# Patient Record
Sex: FEMALE | Race: WHITE | NOT HISPANIC OR LATINO | URBAN - METROPOLITAN AREA
[De-identification: names, ages, dates, MRNs, and addresses within clinical notes are randomized per-mention and may not be internally consistent; named-entity substitution may affect disease eponyms.]

---

## 2020-12-07 ENCOUNTER — OUTPATIENT (OUTPATIENT)
Dept: OUTPATIENT SERVICES | Facility: HOSPITAL | Age: 75
LOS: 1 days | End: 2020-12-07

## 2020-12-07 VITALS
TEMPERATURE: 98 F | DIASTOLIC BLOOD PRESSURE: 85 MMHG | HEART RATE: 67 BPM | WEIGHT: 197.98 LBS | OXYGEN SATURATION: 98 % | HEIGHT: 65.5 IN | SYSTOLIC BLOOD PRESSURE: 124 MMHG

## 2020-12-07 DIAGNOSIS — Z20.828 CONTACT WITH AND (SUSPECTED) EXPOSURE TO OTHER VIRAL COMMUNICABLE DISEASES: ICD-10-CM

## 2020-12-07 DIAGNOSIS — Z90.89 ACQUIRED ABSENCE OF OTHER ORGANS: Chronic | ICD-10-CM

## 2020-12-07 DIAGNOSIS — K57.20 DIVERTICULITIS OF LARGE INTESTINE WITH PERFORATION AND ABSCESS WITHOUT BLEEDING: ICD-10-CM

## 2020-12-07 DIAGNOSIS — Z98.51 TUBAL LIGATION STATUS: Chronic | ICD-10-CM

## 2020-12-07 DIAGNOSIS — I48.91 UNSPECIFIED ATRIAL FIBRILLATION: ICD-10-CM

## 2020-12-07 LAB
ANION GAP SERPL CALC-SCNC: 13 MMOL/L — SIGNIFICANT CHANGE UP (ref 7–14)
BLD GP AB SCN SERPL QL: NEGATIVE — SIGNIFICANT CHANGE UP
BUN SERPL-MCNC: 10 MG/DL — SIGNIFICANT CHANGE UP (ref 7–23)
CALCIUM SERPL-MCNC: 9.5 MG/DL — SIGNIFICANT CHANGE UP (ref 8.4–10.5)
CHLORIDE SERPL-SCNC: 104 MMOL/L — SIGNIFICANT CHANGE UP (ref 98–107)
CO2 SERPL-SCNC: 23 MMOL/L — SIGNIFICANT CHANGE UP (ref 22–31)
CREAT SERPL-MCNC: 0.81 MG/DL — SIGNIFICANT CHANGE UP (ref 0.5–1.3)
GLUCOSE SERPL-MCNC: 84 MG/DL — SIGNIFICANT CHANGE UP (ref 70–99)
HCT VFR BLD CALC: 44.2 % — SIGNIFICANT CHANGE UP (ref 34.5–45)
HGB BLD-MCNC: 13.6 G/DL — SIGNIFICANT CHANGE UP (ref 11.5–15.5)
MCHC RBC-ENTMCNC: 27.5 PG — SIGNIFICANT CHANGE UP (ref 27–34)
MCHC RBC-ENTMCNC: 30.8 GM/DL — LOW (ref 32–36)
MCV RBC AUTO: 89.5 FL — SIGNIFICANT CHANGE UP (ref 80–100)
NRBC # BLD: 0 /100 WBCS — SIGNIFICANT CHANGE UP
NRBC # FLD: 0 K/UL — SIGNIFICANT CHANGE UP
PLATELET # BLD AUTO: 266 K/UL — SIGNIFICANT CHANGE UP (ref 150–400)
POTASSIUM SERPL-MCNC: 3.8 MMOL/L — SIGNIFICANT CHANGE UP (ref 3.5–5.3)
POTASSIUM SERPL-SCNC: 3.8 MMOL/L — SIGNIFICANT CHANGE UP (ref 3.5–5.3)
RBC # BLD: 4.94 M/UL — SIGNIFICANT CHANGE UP (ref 3.8–5.2)
RBC # FLD: 13.9 % — SIGNIFICANT CHANGE UP (ref 10.3–14.5)
RH IG SCN BLD-IMP: POSITIVE — SIGNIFICANT CHANGE UP
SODIUM SERPL-SCNC: 140 MMOL/L — SIGNIFICANT CHANGE UP (ref 135–145)
WBC # BLD: 7.64 K/UL — SIGNIFICANT CHANGE UP (ref 3.8–10.5)
WBC # FLD AUTO: 7.64 K/UL — SIGNIFICANT CHANGE UP (ref 3.8–10.5)

## 2020-12-07 NOTE — H&P PST ADULT - NSANTHOSAYNRD_GEN_A_CORE
No. PERNELL screening performed.  STOP BANG Legend: 0-2 = LOW Risk; 3-4 = INTERMEDIATE Risk; 5-8 = HIGH Risk

## 2020-12-07 NOTE — H&P PST ADULT - NSICDXPASTMEDICALHX_GEN_ALL_CORE_FT
PAST MEDICAL HISTORY:  Abscess of bladder October 2020    Atrial fibrillation off Eliquis since October 2020    Hyperlipidemia     Obesity      PAST MEDICAL HISTORY:  Abscess of bladder October 2020    Atrial fibrillation off Eliquis since October 2020    Diverticulitis of large intestine with perforation and abscess without bleeding     Hyperlipidemia     Obesity

## 2020-12-07 NOTE — H&P PST ADULT - NEGATIVE ENMT SYMPTOMS
no tinnitus/no sinus symptoms/no hearing difficulty/no ear pain/no throat pain/no nasal congestion/no nose bleeds/no dysphagia/no gum bleeding/no vertigo

## 2020-12-07 NOTE — H&P PST ADULT - NSICDXPROBLEM_GEN_ALL_CORE_FT
PROBLEM DIAGNOSES  Problem: Diverticulitis of large intestine with perforation and abscess without bleeding  Assessment and Plan: Patient scheduled for laparoscopic low anterior resection possible stoma repair colvesical fistula on 12/14/2020  Written & verbal preop instructions, gi prophylaxis & surgical soap given  Pt verbalized good understanding.  Teach back done on surgical soap instructions.     Problem: Atrial fibrillation  Assessment and Plan: Patient currently not on anticoagulation, had preop Cardiology evaluation, pending copy of report, Echo and stress test    Problem: Encounter for screening laboratory testing for COVID-19 virus  Assessment and Plan: Patient aware of need for COVID testing prior to procedure and advised to co ordinate with surgeon.

## 2020-12-07 NOTE — H&P PST ADULT - NEGATIVE CARDIOVASCULAR SYMPTOMS
no palpitations/no claudication/no chest pain/no dyspnea on exertion/no peripheral edema/no orthopnea

## 2020-12-07 NOTE — H&P PST ADULT - GASTROINTESTINAL COMMENTS
refer to HPI preop diagnosis diverticulitis of large intestine with perforation and abscess without bleeding

## 2020-12-07 NOTE — H&P PST ADULT - NEGATIVE OPHTHALMOLOGIC SYMPTOMS
no blurred vision L/no pain L/no irritation L/no diplopia/no blurred vision R/no pain R/no irritation R

## 2020-12-07 NOTE — H&P PST ADULT - NSANTHSNORERD_ENT_A_CORE
Patient has switched from  Celexa 10 mg to 20 mg daily. Please send rx to pharmacy.
Pt called back on 8/14/19 and asked DANNY Brown if he could start taking two of the Celexa 10 mg and DANNY Brown approved it. However since he started taking two 10 mg tablets he ran out faster,and when he went to get it refilled they advised him it was to soon to get it refilled. The pharmacy would need to send a new prescription for the Celexa with the direction's to take two 10 mg pill's a day so that he don't run out again. Pt is scheduled to see DANNY Brown on 11/26/19.
No

## 2020-12-07 NOTE — H&P PST ADULT - MUSCULOSKELETAL
details… detailed exam no joint warmth/ROM intact/no joint swelling/no calf tenderness/no joint erythema

## 2020-12-07 NOTE — H&P PST ADULT - HISTORY OF PRESENT ILLNESS
74 yo female presents to [facility] for preop evaluation for laparoscopic low anterior resection possible stoma repair colovesical fistula.  Patient with complaints of difficulty with bowel movement, blood in stool and right lower back pack and was evaluated at [facility].  Patient had diagnostic imaging and diagnosed with diverticulitis of large intestine with perforation and abscess without bleeding.  Patient reports she continues to have difficulty with bowel movements but no longer has blood in stool.

## 2020-12-07 NOTE — H&P PST ADULT - NEGATIVE NEUROLOGICAL SYMPTOMS
no generalized seizures/no weakness/no loss of consciousness/no focal seizures/no vertigo/no paresthesias/no headache

## 2020-12-07 NOTE — H&P PST ADULT - ASSESSMENT
74 yo female with history of diverticulitis of large intestine with perforation and abscess without bleeding

## 2020-12-07 NOTE — H&P PST ADULT - RS GEN PE MLT RESP DETAILS PC
no rales/respirations non-labored/good air movement/airway patent/breath sounds equal/clear to auscultation bilaterally/no wheezes/no rhonchi

## 2020-12-15 PROBLEM — N30.80 OTHER CYSTITIS WITHOUT HEMATURIA: Chronic | Status: ACTIVE | Noted: 2020-12-07

## 2020-12-15 PROBLEM — K57.20 DIVERTICULITIS OF LARGE INTESTINE WITH PERFORATION AND ABSCESS WITHOUT BLEEDING: Chronic | Status: ACTIVE | Noted: 2020-12-07

## 2020-12-15 PROBLEM — E78.5 HYPERLIPIDEMIA, UNSPECIFIED: Chronic | Status: ACTIVE | Noted: 2020-12-07

## 2020-12-15 PROBLEM — E66.9 OBESITY, UNSPECIFIED: Chronic | Status: ACTIVE | Noted: 2020-12-07

## 2020-12-16 PROBLEM — Z00.00 ENCOUNTER FOR PREVENTIVE HEALTH EXAMINATION: Status: ACTIVE | Noted: 2020-12-16

## 2020-12-18 NOTE — ASU PATIENT PROFILE, ADULT - PMH
Abscess of bladder  October 2020  Atrial fibrillation  on no meds  Diverticulitis of large intestine with perforation and abscess without bleeding    Diverticulosis    Former smoker  quit 30 yrs ago  Hyperlipidemia    Obesity

## 2020-12-19 ENCOUNTER — APPOINTMENT (OUTPATIENT)
Dept: DISASTER EMERGENCY | Facility: CLINIC | Age: 75
End: 2020-12-19

## 2020-12-19 DIAGNOSIS — Z01.818 ENCOUNTER FOR OTHER PREPROCEDURAL EXAMINATION: ICD-10-CM

## 2020-12-21 ENCOUNTER — FORM ENCOUNTER (OUTPATIENT)
Age: 75
End: 2020-12-21

## 2020-12-21 LAB — SARS-COV-2 N GENE NPH QL NAA+PROBE: NOT DETECTED

## 2020-12-22 ENCOUNTER — OUTPATIENT (OUTPATIENT)
Dept: OUTPATIENT SERVICES | Facility: HOSPITAL | Age: 75
LOS: 1 days | End: 2020-12-22
Payer: MEDICARE

## 2020-12-22 VITALS — HEART RATE: 55 BPM | OXYGEN SATURATION: 99 % | SYSTOLIC BLOOD PRESSURE: 142 MMHG | DIASTOLIC BLOOD PRESSURE: 75 MMHG

## 2020-12-22 VITALS
DIASTOLIC BLOOD PRESSURE: 88 MMHG | HEART RATE: 61 BPM | WEIGHT: 199.96 LBS | OXYGEN SATURATION: 96 % | HEIGHT: 69 IN | TEMPERATURE: 99 F | RESPIRATION RATE: 18 BRPM | SYSTOLIC BLOOD PRESSURE: 131 MMHG

## 2020-12-22 DIAGNOSIS — R06.00 DYSPNEA, UNSPECIFIED: ICD-10-CM

## 2020-12-22 DIAGNOSIS — Z98.51 TUBAL LIGATION STATUS: Chronic | ICD-10-CM

## 2020-12-22 DIAGNOSIS — Z90.89 ACQUIRED ABSENCE OF OTHER ORGANS: Chronic | ICD-10-CM

## 2020-12-22 LAB
ALBUMIN SERPL ELPH-MCNC: 4.5 G/DL — SIGNIFICANT CHANGE UP (ref 3.3–5)
ALP SERPL-CCNC: 64 U/L — SIGNIFICANT CHANGE UP (ref 40–120)
ALT FLD-CCNC: 10 U/L — SIGNIFICANT CHANGE UP (ref 10–45)
ANION GAP SERPL CALC-SCNC: 10 MMOL/L — SIGNIFICANT CHANGE UP (ref 5–17)
APTT BLD: 31.1 SEC — SIGNIFICANT CHANGE UP (ref 27.5–35.5)
AST SERPL-CCNC: 26 U/L — SIGNIFICANT CHANGE UP (ref 10–40)
BILIRUB SERPL-MCNC: 0.4 MG/DL — SIGNIFICANT CHANGE UP (ref 0.2–1.2)
BUN SERPL-MCNC: 11 MG/DL — SIGNIFICANT CHANGE UP (ref 7–23)
CALCIUM SERPL-MCNC: 9.6 MG/DL — SIGNIFICANT CHANGE UP (ref 8.4–10.5)
CHLORIDE SERPL-SCNC: 106 MMOL/L — SIGNIFICANT CHANGE UP (ref 96–108)
CO2 SERPL-SCNC: 25 MMOL/L — SIGNIFICANT CHANGE UP (ref 22–31)
CREAT SERPL-MCNC: 0.75 MG/DL — SIGNIFICANT CHANGE UP (ref 0.5–1.3)
GLUCOSE SERPL-MCNC: 97 MG/DL — SIGNIFICANT CHANGE UP (ref 70–99)
HCT VFR BLD CALC: 44.5 % — SIGNIFICANT CHANGE UP (ref 34.5–45)
HGB BLD-MCNC: 14.5 G/DL — SIGNIFICANT CHANGE UP (ref 11.5–15.5)
INR BLD: 1 RATIO — SIGNIFICANT CHANGE UP (ref 0.88–1.16)
MCHC RBC-ENTMCNC: 28.5 PG — SIGNIFICANT CHANGE UP (ref 27–34)
MCHC RBC-ENTMCNC: 32.6 GM/DL — SIGNIFICANT CHANGE UP (ref 32–36)
MCV RBC AUTO: 87.4 FL — SIGNIFICANT CHANGE UP (ref 80–100)
NRBC # BLD: 0 /100 WBCS — SIGNIFICANT CHANGE UP (ref 0–0)
PLATELET # BLD AUTO: 252 K/UL — SIGNIFICANT CHANGE UP (ref 150–400)
POTASSIUM SERPL-MCNC: 4.4 MMOL/L — SIGNIFICANT CHANGE UP (ref 3.5–5.3)
POTASSIUM SERPL-SCNC: 4.4 MMOL/L — SIGNIFICANT CHANGE UP (ref 3.5–5.3)
PROT SERPL-MCNC: 7.2 G/DL — SIGNIFICANT CHANGE UP (ref 6–8.3)
PROTHROM AB SERPL-ACNC: 12 SEC — SIGNIFICANT CHANGE UP (ref 10.6–13.6)
RBC # BLD: 5.09 M/UL — SIGNIFICANT CHANGE UP (ref 3.8–5.2)
RBC # FLD: 13.6 % — SIGNIFICANT CHANGE UP (ref 10.3–14.5)
SODIUM SERPL-SCNC: 141 MMOL/L — SIGNIFICANT CHANGE UP (ref 135–145)
WBC # BLD: 8.31 K/UL — SIGNIFICANT CHANGE UP (ref 3.8–10.5)
WBC # FLD AUTO: 8.31 K/UL — SIGNIFICANT CHANGE UP (ref 3.8–10.5)

## 2020-12-22 PROCEDURE — 99152 MOD SED SAME PHYS/QHP 5/>YRS: CPT

## 2020-12-22 PROCEDURE — 93454 CORONARY ARTERY ANGIO S&I: CPT

## 2020-12-22 PROCEDURE — 85730 THROMBOPLASTIN TIME PARTIAL: CPT

## 2020-12-22 PROCEDURE — 93454 CORONARY ARTERY ANGIO S&I: CPT | Mod: 26

## 2020-12-22 PROCEDURE — 93010 ELECTROCARDIOGRAM REPORT: CPT

## 2020-12-22 PROCEDURE — 93005 ELECTROCARDIOGRAM TRACING: CPT

## 2020-12-22 PROCEDURE — C1894: CPT

## 2020-12-22 PROCEDURE — 80053 COMPREHEN METABOLIC PANEL: CPT

## 2020-12-22 PROCEDURE — 85610 PROTHROMBIN TIME: CPT

## 2020-12-22 PROCEDURE — 85027 COMPLETE CBC AUTOMATED: CPT

## 2020-12-22 PROCEDURE — C1769: CPT

## 2020-12-22 PROCEDURE — C1887: CPT

## 2020-12-22 RX ORDER — APIXABAN 2.5 MG/1
1 TABLET, FILM COATED ORAL
Qty: 0 | Refills: 0 | DISCHARGE

## 2020-12-22 RX ORDER — SODIUM CHLORIDE 9 MG/ML
3 INJECTION INTRAMUSCULAR; INTRAVENOUS; SUBCUTANEOUS EVERY 8 HOURS
Refills: 0 | Status: DISCONTINUED | OUTPATIENT
Start: 2020-12-22 | End: 2021-01-05

## 2020-12-22 NOTE — H&P CARDIOLOGY - HISTORY OF PRESENT ILLNESS
74 yo female PMH Afib, HLD and C. diff presents today for cardiac angiogram for cardiac clearance for laparoscopic low anterior resection possible stoma repair colovesical fistula.  Patient reports admission to [facility] where she was dx with afib and started on Eliquis and metoprolol. Evaluated by Dr Jiang after discharge, recommended to have a nuclear stress prior to Sx, which reveals inferior wall ischemia. Here today for [facility] for further ischemic evaluation

## 2020-12-22 NOTE — ASU DISCHARGE PLAN (ADULT/PEDIATRIC) - ASU DC SPECIAL INSTRUCTIONSFT
Wound Care:   the day AFTER your procedure remove bandage GENTLY, and clean using  mild soap and gentle warm, water stream, pat dry. leave OPEN to air. YOU MAY SHOWER   DO NOT apply lotions, creams, ointments, powder, perfumes to your incision site  DO NOT SOAK your site for 1 week ( no baths, no pools, no tubs, etc...)  Check  your groin and /or wrist daily. A small amount of bruising, and soreness are normal    ACTIVITY: for 24 hours   - DO NOT DRIVE  - DO NOT make any important decisions or sign legal documents   - DO NOT operate heavy machineries   - you may resume sexual activity in 48 hours, unless otherwise instructed by your cardiologist     If your procedure was done through the WRIST: for the NEXT 3DAYS:  - avoid pushing, pulling, with that affected wrist   - avoid repeated movement of that hand and wrist ( eg: typing, hammering)  - DO NOT LIFT anything more than 5 lbs     MEDICATION:   take your medications as explained ( see discharge paperwork)   If you received a STENT, you will be taking antiplatelet medications to KEEP YOUR STENT OPEN ( eg: Aspirin, Plavix, Brilinta, Effient, etc).  Take as prescribed DO NOT STOP taking them without consulting with your cardiologist first.     Follow heart healthy diet recommended by your doctor, , if you smoke STOP SMOKING ( may call [phone] for center of tobacco control if you need assistance)     CALL your doctor to make appointment in 2 WEEKS     ***CALL YOUR DOCTOR***  if you experience: fever, chills, body aches, or severe pain, swelling, redness, heat or yellow discharge at incision site  If you experience Bleeding or excruciating pain at the procedural site, swelling ( golf ball size) at your procedural site  If you experience CHEST PAIN  If you experience extremity numbness, tingling, temperature change ( of your procedural site)   If you are unable to reach your doctor, you may contact:   -Cardiology Office at [facility] at [phone] or   - [facility] [phone]  - [facility] [place] [phone]

## 2020-12-22 NOTE — ASU DISCHARGE PLAN (ADULT/PEDIATRIC) - CARE PROVIDER_API CALL
ALLAN NERI  Cardiovascular Diseases  [address] [place] [address] [address]  [place] [address]  Phone: [phone]  Fax: [phone]  Follow Up Time:

## 2020-12-22 NOTE — H&P CARDIOLOGY - PMH
Abscess of bladder  October 2020  Atrial fibrillation    Diverticulitis of large intestine with perforation and abscess without bleeding    Former smoker    Hyperlipidemia    Obesity

## 2020-12-30 ENCOUNTER — OUTPATIENT (OUTPATIENT)
Dept: OUTPATIENT SERVICES | Facility: HOSPITAL | Age: 75
LOS: 1 days | End: 2020-12-30

## 2020-12-30 VITALS
RESPIRATION RATE: 16 BRPM | DIASTOLIC BLOOD PRESSURE: 76 MMHG | SYSTOLIC BLOOD PRESSURE: 132 MMHG | HEIGHT: 67 IN | OXYGEN SATURATION: 98 % | WEIGHT: 195.99 LBS | TEMPERATURE: 98 F | HEART RATE: 98 BPM

## 2020-12-30 DIAGNOSIS — K57.20 DIVERTICULITIS OF LARGE INTESTINE WITH PERFORATION AND ABSCESS WITHOUT BLEEDING: ICD-10-CM

## 2020-12-30 DIAGNOSIS — Z98.51 TUBAL LIGATION STATUS: Chronic | ICD-10-CM

## 2020-12-30 DIAGNOSIS — Z87.19 PERSONAL HISTORY OF OTHER DISEASES OF THE DIGESTIVE SYSTEM: ICD-10-CM

## 2020-12-30 DIAGNOSIS — Z90.89 ACQUIRED ABSENCE OF OTHER ORGANS: Chronic | ICD-10-CM

## 2020-12-30 RX ORDER — METOPROLOL TARTRATE 50 MG
0.5 TABLET ORAL
Qty: 0 | Refills: 0 | DISCHARGE

## 2020-12-30 RX ORDER — APIXABAN 2.5 MG/1
1 TABLET, FILM COATED ORAL
Qty: 0 | Refills: 0 | DISCHARGE

## 2020-12-30 RX ORDER — ASPIRIN/CALCIUM CARB/MAGNESIUM 324 MG
1 TABLET ORAL
Qty: 0 | Refills: 0 | DISCHARGE

## 2020-12-30 NOTE — H&P PST ADULT - HISTORY OF PRESENT ILLNESS
74 yo female presents to [facility] for preop evaluation for laparoscopic low anterior resection possible stoma repair colovesical fistula.  Patient with complaints of difficulty with bowel movement, blood in stool when she wipes and right lower back pack and was evaluated at [facility].  Patient had diagnostic imaging and diagnosed with diverticulitis of large intestine with perforation and abscess without bleeding.  Patient reports she continues to have difficulty with bowel movements but no longer has blood in stool. 76 yo female presents to [facility] for preop evaluation for laparoscopic low anterior resection possible stoma repair colovesical fistula. Patient with complaints of difficulty with bowel movement, blood in stool when she wipes and right lower back pack and was evaluated at [facility].  Patient had diagnostic imaging and diagnosed with diverticulitis of large intestine with perforation and abscess without bleeding.  Patient reports she continues to have difficulty with bowel movements but no longer has blood in stool.

## 2020-12-30 NOTE — H&P PST ADULT - NSICDXPROBLEM_GEN_ALL_CORE_FT
PROBLEM DIAGNOSES  Problem: History of diverticulosis  Assessment and Plan:       R/O PROBLEM DIAGNOSES  Problem: Snoring  Assessment and Plan:     Problem: Atrial fibrillation  Assessment and Plan:      PROBLEM DIAGNOSES  Problem: History of diverticulosis  Assessment and Plan: Pt scheduled for laparoscopic lower anterior resection, possible stoma, possible repair , colovesical fistula.   labs pending, ekg in chart.  preop instructions provided to pt.   famotidine and chlorhexidine scrubs provided to pt.  pt going for COVID testing on 1/8/21        R/O PROBLEM DIAGNOSES  Problem: Snoring  Assessment and Plan: molina precautions recommended.  OR booking notified via fax.    Problem: Atrial fibrillation  Assessment and Plan: pt with h/o afib - on no anticoagulation.  pt had cardiac clearnce - will obtain copy.

## 2020-12-30 NOTE — H&P PST ADULT - NEUROLOGICAL DETAILS
alert and oriented x 3/normal strength alert and oriented x 3/responds to pain/responds to verbal commands/normal strength

## 2020-12-30 NOTE — H&P PST ADULT - NSICDXPASTMEDICALHX_GEN_ALL_CORE_FT
PAST MEDICAL HISTORY:  Abscess of bladder October 2020    Atrial fibrillation on no meds    Diverticulitis of large intestine with perforation and abscess without bleeding     Diverticulosis     Former smoker quit 30 yrs ago    Hyperlipidemia     Obesity

## 2020-12-30 NOTE — H&P PST ADULT - NEGATIVE NEUROLOGICAL SYMPTOMS
no weakness/no paresthesias/no generalized seizures/no focal seizures/no vertigo/no headache/no loss of consciousness

## 2020-12-30 NOTE — H&P PST ADULT - NSICDXFAMILYHX_GEN_ALL_CORE_FT
FAMILY HISTORY:  Mother  Still living? Unknown  FH: type 2 diabetes, Age at diagnosis: Age Unknown

## 2020-12-30 NOTE — H&P PST ADULT - ASSESSMENT
76 yo female with history of diverticulitis of large intestine with perforation and abscess without bleeding. presents to [facility] for preop evaluation for laparoscopic low anterior resection possible stoma repair colovesical fistula.

## 2020-12-30 NOTE — H&P PST ADULT - GASTROINTESTINAL COMMENTS
preop diagnosis diverticulitis of large intestine with perforation and abscess without bleeding blood in stool while wiping

## 2020-12-30 NOTE — H&P PST ADULT - MUSCULOSKELETAL
ROM intact/no joint swelling/no joint erythema/no joint warmth/no calf tenderness details… detailed exam

## 2020-12-30 NOTE — H&P PST ADULT - NEGATIVE MUSCULOSKELETAL SYMPTOMS
no arthralgia/no arthritis/no joint swelling/no muscle cramps/no muscle weakness/no stiffness/no back pain

## 2021-01-08 ENCOUNTER — APPOINTMENT (OUTPATIENT)
Dept: DISASTER EMERGENCY | Facility: CLINIC | Age: 76
End: 2021-01-08

## 2021-01-10 ENCOUNTER — TRANSCRIPTION ENCOUNTER (OUTPATIENT)
Age: 76
End: 2021-01-10

## 2021-01-10 LAB — SARS-COV-2 N GENE NPH QL NAA+PROBE: NOT DETECTED

## 2021-01-11 ENCOUNTER — RESULT REVIEW (OUTPATIENT)
Age: 76
End: 2021-01-11

## 2021-01-11 ENCOUNTER — INPATIENT (INPATIENT)
Facility: HOSPITAL | Age: 76
LOS: 7 days | Discharge: HOME | End: 2021-01-19
Payer: MEDICARE

## 2021-01-11 VITALS
HEIGHT: 67 IN | SYSTOLIC BLOOD PRESSURE: 162 MMHG | TEMPERATURE: 98 F | WEIGHT: 195.99 LBS | DIASTOLIC BLOOD PRESSURE: 76 MMHG | OXYGEN SATURATION: 98 % | RESPIRATION RATE: 16 BRPM | HEART RATE: 59 BPM

## 2021-01-11 DIAGNOSIS — Z90.89 ACQUIRED ABSENCE OF OTHER ORGANS: Chronic | ICD-10-CM

## 2021-01-11 DIAGNOSIS — Z98.51 TUBAL LIGATION STATUS: Chronic | ICD-10-CM

## 2021-01-11 DIAGNOSIS — K57.20 DIVERTICULITIS OF LARGE INTESTINE WITH PERFORATION AND ABSCESS WITHOUT BLEEDING: ICD-10-CM

## 2021-01-11 LAB
ANION GAP SERPL CALC-SCNC: 11 MMOL/L — SIGNIFICANT CHANGE UP (ref 7–14)
BUN SERPL-MCNC: 12 MG/DL — SIGNIFICANT CHANGE UP (ref 7–23)
CALCIUM SERPL-MCNC: 8.7 MG/DL — SIGNIFICANT CHANGE UP (ref 8.4–10.5)
CHLORIDE SERPL-SCNC: 102 MMOL/L — SIGNIFICANT CHANGE UP (ref 98–107)
CO2 SERPL-SCNC: 24 MMOL/L — SIGNIFICANT CHANGE UP (ref 22–31)
CREAT SERPL-MCNC: 0.9 MG/DL — SIGNIFICANT CHANGE UP (ref 0.5–1.3)
GLUCOSE SERPL-MCNC: 191 MG/DL — HIGH (ref 70–99)
HCT VFR BLD CALC: 44.4 % — SIGNIFICANT CHANGE UP (ref 34.5–45)
HGB BLD-MCNC: 14 G/DL — SIGNIFICANT CHANGE UP (ref 11.5–15.5)
MAGNESIUM SERPL-MCNC: 1.9 MG/DL — SIGNIFICANT CHANGE UP (ref 1.6–2.6)
MCHC RBC-ENTMCNC: 27.7 PG — SIGNIFICANT CHANGE UP (ref 27–34)
MCHC RBC-ENTMCNC: 31.5 GM/DL — LOW (ref 32–36)
MCV RBC AUTO: 87.9 FL — SIGNIFICANT CHANGE UP (ref 80–100)
NRBC # BLD: 0 /100 WBCS — SIGNIFICANT CHANGE UP
NRBC # FLD: 0 K/UL — SIGNIFICANT CHANGE UP
PHOSPHATE SERPL-MCNC: 3.5 MG/DL — SIGNIFICANT CHANGE UP (ref 2.5–4.5)
PLATELET # BLD AUTO: 217 K/UL — SIGNIFICANT CHANGE UP (ref 150–400)
POTASSIUM SERPL-MCNC: 3.6 MMOL/L — SIGNIFICANT CHANGE UP (ref 3.5–5.3)
POTASSIUM SERPL-SCNC: 3.6 MMOL/L — SIGNIFICANT CHANGE UP (ref 3.5–5.3)
RBC # BLD: 5.05 M/UL — SIGNIFICANT CHANGE UP (ref 3.8–5.2)
RBC # FLD: 13.7 % — SIGNIFICANT CHANGE UP (ref 10.3–14.5)
SODIUM SERPL-SCNC: 137 MMOL/L — SIGNIFICANT CHANGE UP (ref 135–145)
WBC # BLD: 14.53 K/UL — HIGH (ref 3.8–10.5)
WBC # FLD AUTO: 14.53 K/UL — HIGH (ref 3.8–10.5)

## 2021-01-11 PROCEDURE — 88304 TISSUE EXAM BY PATHOLOGIST: CPT | Mod: 26

## 2021-01-11 PROCEDURE — 88307 TISSUE EXAM BY PATHOLOGIST: CPT | Mod: 26

## 2021-01-11 RX ORDER — SIMVASTATIN 20 MG/1
1 TABLET, FILM COATED ORAL
Qty: 0 | Refills: 0 | DISCHARGE

## 2021-01-11 RX ORDER — PIPERACILLIN AND TAZOBACTAM 4; .5 G/20ML; G/20ML
3.38 INJECTION, POWDER, LYOPHILIZED, FOR SOLUTION INTRAVENOUS EVERY 8 HOURS
Refills: 0 | Status: DISCONTINUED | OUTPATIENT
Start: 2021-01-11 | End: 2021-01-14

## 2021-01-11 RX ORDER — POTASSIUM CHLORIDE 20 MEQ
10 PACKET (EA) ORAL
Refills: 0 | Status: COMPLETED | OUTPATIENT
Start: 2021-01-11 | End: 2021-01-11

## 2021-01-11 RX ORDER — HYDROMORPHONE HYDROCHLORIDE 2 MG/ML
0.5 INJECTION INTRAMUSCULAR; INTRAVENOUS; SUBCUTANEOUS
Refills: 0 | Status: DISCONTINUED | OUTPATIENT
Start: 2021-01-11 | End: 2021-01-11

## 2021-01-11 RX ORDER — ENOXAPARIN SODIUM 100 MG/ML
40 INJECTION SUBCUTANEOUS DAILY
Refills: 0 | Status: DISCONTINUED | OUTPATIENT
Start: 2021-01-11 | End: 2021-01-19

## 2021-01-11 RX ORDER — HYDROMORPHONE HYDROCHLORIDE 2 MG/ML
1 INJECTION INTRAMUSCULAR; INTRAVENOUS; SUBCUTANEOUS
Refills: 0 | Status: DISCONTINUED | OUTPATIENT
Start: 2021-01-11 | End: 2021-01-11

## 2021-01-11 RX ORDER — ACETAMINOPHEN 500 MG
1000 TABLET ORAL EVERY 6 HOURS
Refills: 0 | Status: COMPLETED | OUTPATIENT
Start: 2021-01-11 | End: 2021-01-12

## 2021-01-11 RX ORDER — SODIUM CHLORIDE 9 MG/ML
1000 INJECTION, SOLUTION INTRAVENOUS
Refills: 0 | Status: DISCONTINUED | OUTPATIENT
Start: 2021-01-11 | End: 2021-01-11

## 2021-01-11 RX ORDER — HYDROMORPHONE HYDROCHLORIDE 2 MG/ML
0.5 INJECTION INTRAMUSCULAR; INTRAVENOUS; SUBCUTANEOUS EVERY 4 HOURS
Refills: 0 | Status: DISCONTINUED | OUTPATIENT
Start: 2021-01-11 | End: 2021-01-13

## 2021-01-11 RX ORDER — MAGNESIUM SULFATE 500 MG/ML
1 VIAL (ML) INJECTION ONCE
Refills: 0 | Status: COMPLETED | OUTPATIENT
Start: 2021-01-11 | End: 2021-01-11

## 2021-01-11 RX ADMIN — Medication 400 MILLIGRAM(S): at 22:25

## 2021-01-11 RX ADMIN — SODIUM CHLORIDE 100 MILLILITER(S): 9 INJECTION, SOLUTION INTRAVENOUS at 12:14

## 2021-01-11 RX ADMIN — Medication 100 MILLIEQUIVALENT(S): at 17:44

## 2021-01-11 RX ADMIN — Medication 100 MILLIEQUIVALENT(S): at 16:41

## 2021-01-11 RX ADMIN — Medication 100 GRAM(S): at 15:53

## 2021-01-11 RX ADMIN — PIPERACILLIN AND TAZOBACTAM 25 GRAM(S): 4; .5 INJECTION, POWDER, LYOPHILIZED, FOR SOLUTION INTRAVENOUS at 23:11

## 2021-01-11 RX ADMIN — Medication 400 MILLIGRAM(S): at 16:37

## 2021-01-11 RX ADMIN — Medication 100 MILLIEQUIVALENT(S): at 18:47

## 2021-01-11 RX ADMIN — PIPERACILLIN AND TAZOBACTAM 25 GRAM(S): 4; .5 INJECTION, POWDER, LYOPHILIZED, FOR SOLUTION INTRAVENOUS at 13:38

## 2021-01-11 RX ADMIN — ENOXAPARIN SODIUM 40 MILLIGRAM(S): 100 INJECTION SUBCUTANEOUS at 12:33

## 2021-01-11 NOTE — CHART NOTE - NSCHARTNOTEFT_GEN_A_CORE
POST-OP NOTE    JULES BUSTILLOS | [id number] | [facility] 05    Procedure: s/p laparoscopic assisted low anterior resection with bladder fistula     Subjective: Patient is recovering well in PACU. She is awake and alert, has no complaints. Pain is well controlled.     Vital Signs Last 24 Hrs  T(C): 37 (11 Jan 2021 11:45), Max: 37 (11 Jan 2021 11:45)  T(F): 98.6 (11 Jan 2021 11:45), Max: 98.6 (11 Jan 2021 11:45)  HR: 47 (11 Jan 2021 16:30) (46 - 83)  BP: 116/38 (11 Jan 2021 16:00) (106/44 - 162/76)  BP(mean): 59 (11 Jan 2021 16:00) (57 - 79)  RR: 11 (11 Jan 2021 16:30) (10 - 19)  SpO2: 100% (11 Jan 2021 16:30) (93% - 100%)  I&O's Summary    11 Jan 2021 07:01  -  11 Jan 2021 16:52  --------------------------------------------------------  IN: 200 mL / OUT: 420 mL / NET: -220 mL                            14.0   14.53 )-----------( 217      ( 11 Jan 2021 12:47 )             44.4     01-11    137  |  102  |  12  ----------------------------<  191<H>  3.6   |  24  |  0.90    Ca    8.7      11 Jan 2021 12:47  Phos  3.5     01-11  Mg     1.9     01-11      PHYSICAL EXAM:  Gen: NAD  Resp: breathing easily, no stridor  CV: RRR  Abdomen: soft, nontender, nondistended, midline incision covered with Aquacell dressing, two left-sided lap ports dressings c/d/i, RLQ MARLON draining SS fluid.   : ramirez in place draining clear yellow urine   Skin: Normal color, no rashes or lesions      Gurt Rosalva PGY1  [facility] #[id number]

## 2021-01-11 NOTE — BRIEF OPERATIVE NOTE - OPERATION/FINDINGS
laparoscopic assisted low anterior resection, three ports place, colon mobilized along white line of Toldt up to splenic flexure, midline incision extending from umbilicus to pubis proximal and distal colon transected with TA stapler, fistula noted to bladder with pus draining (cultures sent), Bakers anastomosis completed using EEA stapler, negative leak test, JPx1 in pelvis

## 2021-01-12 DIAGNOSIS — E78.5 HYPERLIPIDEMIA, UNSPECIFIED: ICD-10-CM

## 2021-01-12 DIAGNOSIS — N30.80 OTHER CYSTITIS WITHOUT HEMATURIA: ICD-10-CM

## 2021-01-12 DIAGNOSIS — I48.91 UNSPECIFIED ATRIAL FIBRILLATION: ICD-10-CM

## 2021-01-12 LAB
ANION GAP SERPL CALC-SCNC: 12 MMOL/L — SIGNIFICANT CHANGE UP (ref 7–14)
BUN SERPL-MCNC: 7 MG/DL — SIGNIFICANT CHANGE UP (ref 7–23)
CALCIUM SERPL-MCNC: 8.8 MG/DL — SIGNIFICANT CHANGE UP (ref 8.4–10.5)
CHLORIDE SERPL-SCNC: 103 MMOL/L — SIGNIFICANT CHANGE UP (ref 98–107)
CO2 SERPL-SCNC: 22 MMOL/L — SIGNIFICANT CHANGE UP (ref 22–31)
CREAT SERPL-MCNC: 0.92 MG/DL — SIGNIFICANT CHANGE UP (ref 0.5–1.3)
GLUCOSE SERPL-MCNC: 128 MG/DL — HIGH (ref 70–99)
GRAM STN FLD: SIGNIFICANT CHANGE UP
HCT VFR BLD CALC: 41.9 % — SIGNIFICANT CHANGE UP (ref 34.5–45)
HGB BLD-MCNC: 13.3 G/DL — SIGNIFICANT CHANGE UP (ref 11.5–15.5)
MAGNESIUM SERPL-MCNC: 2 MG/DL — SIGNIFICANT CHANGE UP (ref 1.6–2.6)
MCHC RBC-ENTMCNC: 27.6 PG — SIGNIFICANT CHANGE UP (ref 27–34)
MCHC RBC-ENTMCNC: 31.7 GM/DL — LOW (ref 32–36)
MCV RBC AUTO: 86.9 FL — SIGNIFICANT CHANGE UP (ref 80–100)
NRBC # BLD: 0 /100 WBCS — SIGNIFICANT CHANGE UP
NRBC # FLD: 0 K/UL — SIGNIFICANT CHANGE UP
PHOSPHATE SERPL-MCNC: 2.8 MG/DL — SIGNIFICANT CHANGE UP (ref 2.5–4.5)
PLATELET # BLD AUTO: 240 K/UL — SIGNIFICANT CHANGE UP (ref 150–400)
POTASSIUM SERPL-MCNC: 4 MMOL/L — SIGNIFICANT CHANGE UP (ref 3.5–5.3)
POTASSIUM SERPL-SCNC: 4 MMOL/L — SIGNIFICANT CHANGE UP (ref 3.5–5.3)
RBC # BLD: 4.82 M/UL — SIGNIFICANT CHANGE UP (ref 3.8–5.2)
RBC # FLD: 13.8 % — SIGNIFICANT CHANGE UP (ref 10.3–14.5)
SODIUM SERPL-SCNC: 137 MMOL/L — SIGNIFICANT CHANGE UP (ref 135–145)
SPECIMEN SOURCE: SIGNIFICANT CHANGE UP
WBC # BLD: 15.32 K/UL — HIGH (ref 3.8–10.5)
WBC # FLD AUTO: 15.32 K/UL — HIGH (ref 3.8–10.5)

## 2021-01-12 RX ORDER — SODIUM CHLORIDE 9 MG/ML
1000 INJECTION, SOLUTION INTRAVENOUS
Refills: 0 | Status: DISCONTINUED | OUTPATIENT
Start: 2021-01-12 | End: 2021-01-14

## 2021-01-12 RX ORDER — SODIUM CHLORIDE 9 MG/ML
1000 INJECTION INTRAMUSCULAR; INTRAVENOUS; SUBCUTANEOUS
Refills: 0 | Status: DISCONTINUED | OUTPATIENT
Start: 2021-01-12 | End: 2021-01-12

## 2021-01-12 RX ORDER — ACETAMINOPHEN 500 MG
1000 TABLET ORAL EVERY 6 HOURS
Refills: 0 | Status: COMPLETED | OUTPATIENT
Start: 2021-01-12 | End: 2021-01-13

## 2021-01-12 RX ADMIN — Medication 400 MILLIGRAM(S): at 23:38

## 2021-01-12 RX ADMIN — PIPERACILLIN AND TAZOBACTAM 25 GRAM(S): 4; .5 INJECTION, POWDER, LYOPHILIZED, FOR SOLUTION INTRAVENOUS at 22:04

## 2021-01-12 RX ADMIN — Medication 400 MILLIGRAM(S): at 17:46

## 2021-01-12 RX ADMIN — PIPERACILLIN AND TAZOBACTAM 25 GRAM(S): 4; .5 INJECTION, POWDER, LYOPHILIZED, FOR SOLUTION INTRAVENOUS at 13:10

## 2021-01-12 RX ADMIN — SODIUM CHLORIDE 100 MILLILITER(S): 9 INJECTION, SOLUTION INTRAVENOUS at 09:29

## 2021-01-12 RX ADMIN — PIPERACILLIN AND TAZOBACTAM 25 GRAM(S): 4; .5 INJECTION, POWDER, LYOPHILIZED, FOR SOLUTION INTRAVENOUS at 06:29

## 2021-01-12 RX ADMIN — SODIUM CHLORIDE 100 MILLILITER(S): 9 INJECTION INTRAMUSCULAR; INTRAVENOUS; SUBCUTANEOUS at 00:12

## 2021-01-12 RX ADMIN — Medication 400 MILLIGRAM(S): at 10:45

## 2021-01-12 RX ADMIN — Medication 400 MILLIGRAM(S): at 04:16

## 2021-01-12 RX ADMIN — SODIUM CHLORIDE 100 MILLILITER(S): 9 INJECTION INTRAMUSCULAR; INTRAVENOUS; SUBCUTANEOUS at 06:29

## 2021-01-12 RX ADMIN — ENOXAPARIN SODIUM 40 MILLIGRAM(S): 100 INJECTION SUBCUTANEOUS at 11:42

## 2021-01-12 NOTE — CONSULT NOTE ADULT - ASSESSMENT
74 yo female presents to [facility] for preop evaluation for laparoscopic low anterior resection possible stoma repair colovesical fistula now s/p uneventful surgery

## 2021-01-12 NOTE — PROGRESS NOTE ADULT - SUBJECTIVE AND OBJECTIVE BOX
STATUS POST:  Laparoscopic low anterior resection with colovesicular fistula     POST OPERATIVE DAY #: 1    Vital Signs Last 24 Hrs  T(C): 36.7 (12 Jan 2021 05:45), Max: 37 (11 Jan 2021 11:45)  T(F): 98 (12 Jan 2021 05:45), Max: 98.6 (11 Jan 2021 11:45)  HR: 61 (12 Jan 2021 05:45) (46 - 83)  BP: 140/65 (12 Jan 2021 05:45) (106/44 - 146/61)  BP(mean): 78 (11 Jan 2021 20:00) (57 - 118)  RR: 19 (12 Jan 2021 05:45) (10 - 25)  SpO2: 98% (12 Jan 2021 05:45) (91% - 100%)      SUBJECTIVE: Pt seen and examined. No acute events overnight. Pain tolerated. NPO with sips; -BM/-flatus.     Ramirez: 1290  MARLON: 90    General Appearance: Appears well, NAD  Neck: Supple  Chest: Equal expansion bilaterally, equal breath sounds  CV: Pulse regular presently  Abdomen: Soft, appropriate incisional tenderness, dressings clean and dry and intact  : ramirez in place draining clear yellow urine  Extremities: Grossly symmetric, SCD's in place     I&O's Summary    11 Jan 2021 07:01  -  12 Jan 2021 07:00  --------------------------------------------------------  IN: 700 mL / OUT: 1380 mL / NET: -680 mL      I&O's Detail    11 Jan 2021 07:01  -  12 Jan 2021 07:00  --------------------------------------------------------  IN:    Lactated Ringers: 700 mL  Total IN: 700 mL    OUT:    Bulb (mL): 90 mL    Indwelling Catheter - Urethral (mL): 1290 mL  Total OUT: 1380 mL    Total NET: -680 mL          MEDICATIONS  (STANDING):  acetaminophen  IVPB .. 1000 milliGRAM(s) IV Intermittent every 6 hours  enoxaparin Injectable 40 milliGRAM(s) SubCutaneous daily  lactated ringers. 1000 milliLiter(s) (100 mL/Hr) IV Continuous <Continuous>  piperacillin/tazobactam IVPB.. 3.375 Gram(s) IV Intermittent every 8 hours    MEDICATIONS  (PRN):  HYDROmorphone  Injectable 0.5 milliGRAM(s) IV Push every 4 hours PRN Moderate Pain (4 - 6)      LABS:                        14.0   14.53 )-----------( 217      ( 11 Jan 2021 12:47 )             44.4     01-11    137  |  102  |  12  ----------------------------<  191<H>  3.6   |  24  |  0.90    Ca    8.7      11 Jan 2021 12:47  Phos  3.5     01-11  Mg     1.9     01-11            RADIOLOGY & ADDITIONAL STUDIES:

## 2021-01-12 NOTE — CONSULT NOTE ADULT - PROBLEM SELECTOR RECOMMENDATION 2
s/p surgery  continue piperacillin/tazobactam  clinically improving   leukocytosis should improve in a day or two  pain management with Tylenol

## 2021-01-12 NOTE — PROGRESS NOTE ADULT - ASSESSMENT
74 yo female with HLD, diverticulitis, colovesicular fistula s/p laparoscopic LAR 1/11, recovering well on floors.     Plan:    - Pain control  - Diet: NPO with sips  - IVF  - C/w Timmons (will need CT cystogram)  - Monitor I&Os   - PT eval  - Zosyn  - DVT ppx: Lovenox    [facility]   [id number]

## 2021-01-12 NOTE — CONSULT NOTE ADULT - SUBJECTIVE AND OBJECTIVE BOX
Patient is a 75y old  Female who presents with a chief complaint of "I am having colon surgery" (30 Dec 2020 12:10)      HPI:    74 yo female presents to [facility] for preop evaluation for laparoscopic low anterior resection possible stoma repair colovesical fistula. Patient with complaints of difficulty with bowel movement, blood in stool when she wipes and right lower back pack and was evaluated at [facility].  Patient had diagnostic imaging and diagnosed with diverticulitis of large intestine with perforation and abscess without bleeding.  Patient reports she continues to have difficulty with bowel movements but no longer has blood in stool. She is POD # 1 today, c/o lower abdominal pain 6/10      PAST MEDICAL & SURGICAL HISTORY:  Diverticulosis    Former smoker  quit 30 yrs ago    Diverticulitis of large intestine with perforation and abscess without bleeding    Obesity    Atrial fibrillation  on no meds    Abscess of bladder  October 2020    Hyperlipidemia    History of tonsillectomy    History of tubal ligation        Review of Systems:   CONSTITUTIONAL: No fever, weight loss, or fatigue  EYES: No eye pain, visual disturbances, or discharge  ENMT:  No difficulty hearing, tinnitus, vertigo; No sinus or throat pain  NECK: No pain or stiffness  RESPIRATORY: No cough, wheezing, chills or hemoptysis; No shortness of breath  CARDIOVASCULAR: No chest pain, palpitations, dizziness, leg swelling or sob  GASTROINTESTINAL: No abdominal pain. No nausea, vomiting, diarrhea or constipation  GENITOURINARY: No dysuria, frequency, hematuria, or incontinence  NEUROLOGICAL: No headaches, memory loss, loss of strength, numbness, or tremors  SKIN: No itching, burning, rashes, or lesions   LYMPH NODES: No enlarged glands  ENDOCRINE: No heat or cold intolerance; No hair loss  MUSCULOSKELETAL: No joint pain or swelling; No muscle, back, or extremity pain  PSYCHIATRIC: No depression, anxiety, mood swings, or difficulty sleeping  HEME/LYMPH: No easy bruising, or bleeding gums  ALLERGY AND IMMUNOLOGIC: No hives or eczema    Allergies    No Known Allergies      Social History: non smoker quit 30 years prior  no IVDA  no ETOH abuse   lives with     FAMILY HISTORY:  FH: type 2 diabetes (Mother)  mother        MEDICATIONS  (STANDING):  enoxaparin Injectable 40 milliGRAM(s) SubCutaneous daily  lactated ringers. 1000 milliLiter(s) (100 mL/Hr) IV Continuous <Continuous>  piperacillin/tazobactam IVPB.. 3.375 Gram(s) IV Intermittent every 8 hours    MEDICATIONS  (PRN):  HYDROmorphone  Injectable 0.5 milliGRAM(s) IV Push every 4 hours PRN Moderate Pain (4 - 6)      CAPILLARY BLOOD GLUCOSE        I&O's Summary    11 Jan 2021 07:01  -  12 Jan 2021 07:00  --------------------------------------------------------  IN: 700 mL / OUT: 1380 mL / NET: -680 mL    12 Jan 2021 07:01  -  12 Jan 2021 10:52  --------------------------------------------------------  IN: 0 mL / OUT: 90 mL / NET: -90 mL        24hrs Vital:  T(C): 36.7 (01-12-21 @ 05:45), Max: 37 (01-11-21 @ 11:45)  HR: 61 (01-12-21 @ 05:45) (46 - 83)  BP: 140/65 (01-12-21 @ 05:45) (106/44 - 146/61)  RR: 19 (01-12-21 @ 05:45) (10 - 25)  SpO2: 98% (01-12-21 @ 05:45) (91% - 100%)    PHYSICAL EXAM:  GENERAL: NAD, well-developed  HEAD:  Atraumatic, Normocephalic  EYES: EOMI, PERRLA, conjunctiva and sclera clear  NECK: Supple, No JVD  CHEST/LUNG: Clear to auscultation bilaterally; No wheeze  HEART: S1S2; No rubs, or gallops, no murmurs  ABDOMEN: Soft, appropriately tender incisions  drains in place  EXTREMITIES:  + Peripheral Pulses, No clubbing or cyanosis, no edema  PSYCH: AO x 3,   NEUROLOGY: Alert, no focal motor or sensory deficits  SKIN: No rashes or lesions    LABS:                        14.0   14.53 )-----------( 217      ( 11 Jan 2021 12:47 )             44.4     01-11    137  |  102  |  12  ----------------------------<  191<H>  3.6   |  24  |  0.90    Ca    8.7      11 Jan 2021 12:47  Phos  3.5     01-11  Mg     1.9     01-11                RADIOLOGY & ADDITIONAL TESTS:    Consultant(s) Notes Reviewed:      Care Discussed with Consultants/Other Providers:

## 2021-01-12 NOTE — PROGRESS NOTE ADULT - SUBJECTIVE AND OBJECTIVE BOX
ANESTHESIA POSTOP CHECK    75y Female POSTOP DAY 1    Vital Signs Last 24 Hrs  T(C): 36.7 (12 Jan 2021 05:45), Max: 37 (11 Jan 2021 11:45)  T(F): 98 (12 Jan 2021 05:45), Max: 98.6 (11 Jan 2021 11:45)  HR: 61 (12 Jan 2021 05:45) (46 - 83)  BP: 140/65 (12 Jan 2021 05:45) (106/44 - 146/61)  BP(mean): 78 (11 Jan 2021 20:00) (57 - 118)  RR: 19 (12 Jan 2021 05:45) (10 - 25)  SpO2: 98% (12 Jan 2021 05:45) (91% - 100%)  I&O's Summary    11 Jan 2021 07:01  -  12 Jan 2021 07:00  --------------------------------------------------------  IN: 700 mL / OUT: 1380 mL / NET: -680 mL    12 Jan 2021 07:01  -  12 Jan 2021 11:03  --------------------------------------------------------  IN: 0 mL / OUT: 90 mL / NET: -90 mL        [X ] NO APPARENT ANESTHESIA COMPLICATIONS      Patient sitting up in bed, c/o pain with movement, awaiting pain medication.

## 2021-01-13 LAB
-  AMIKACIN: SIGNIFICANT CHANGE UP
-  AMIKACIN: SIGNIFICANT CHANGE UP
-  AMOXICILLIN/CLAVULANIC ACID: SIGNIFICANT CHANGE UP
-  AMOXICILLIN/CLAVULANIC ACID: SIGNIFICANT CHANGE UP
-  AMPICILLIN/SULBACTAM: SIGNIFICANT CHANGE UP
-  AMPICILLIN/SULBACTAM: SIGNIFICANT CHANGE UP
-  AMPICILLIN: SIGNIFICANT CHANGE UP
-  AMPICILLIN: SIGNIFICANT CHANGE UP
-  AZTREONAM: SIGNIFICANT CHANGE UP
-  AZTREONAM: SIGNIFICANT CHANGE UP
-  CEFAZOLIN: SIGNIFICANT CHANGE UP
-  CEFAZOLIN: SIGNIFICANT CHANGE UP
-  CEFEPIME: SIGNIFICANT CHANGE UP
-  CEFEPIME: SIGNIFICANT CHANGE UP
-  CEFOXITIN: SIGNIFICANT CHANGE UP
-  CEFOXITIN: SIGNIFICANT CHANGE UP
-  CEFTRIAXONE: SIGNIFICANT CHANGE UP
-  CEFTRIAXONE: SIGNIFICANT CHANGE UP
-  CIPROFLOXACIN: SIGNIFICANT CHANGE UP
-  CIPROFLOXACIN: SIGNIFICANT CHANGE UP
-  ERTAPENEM: SIGNIFICANT CHANGE UP
-  ERTAPENEM: SIGNIFICANT CHANGE UP
-  GENTAMICIN: SIGNIFICANT CHANGE UP
-  GENTAMICIN: SIGNIFICANT CHANGE UP
-  IMIPENEM: SIGNIFICANT CHANGE UP
-  IMIPENEM: SIGNIFICANT CHANGE UP
-  LEVOFLOXACIN: SIGNIFICANT CHANGE UP
-  LEVOFLOXACIN: SIGNIFICANT CHANGE UP
-  MEROPENEM: SIGNIFICANT CHANGE UP
-  MEROPENEM: SIGNIFICANT CHANGE UP
-  PIPERACILLIN/TAZOBACTAM: SIGNIFICANT CHANGE UP
-  PIPERACILLIN/TAZOBACTAM: SIGNIFICANT CHANGE UP
-  TOBRAMYCIN: SIGNIFICANT CHANGE UP
-  TOBRAMYCIN: SIGNIFICANT CHANGE UP
-  TRIMETHOPRIM/SULFAMETHOXAZOLE: SIGNIFICANT CHANGE UP
-  TRIMETHOPRIM/SULFAMETHOXAZOLE: SIGNIFICANT CHANGE UP
ANION GAP SERPL CALC-SCNC: 12 MMOL/L — SIGNIFICANT CHANGE UP (ref 7–14)
BUN SERPL-MCNC: 6 MG/DL — LOW (ref 7–23)
CALCIUM SERPL-MCNC: 9.1 MG/DL — SIGNIFICANT CHANGE UP (ref 8.4–10.5)
CHLORIDE SERPL-SCNC: 101 MMOL/L — SIGNIFICANT CHANGE UP (ref 98–107)
CO2 SERPL-SCNC: 24 MMOL/L — SIGNIFICANT CHANGE UP (ref 22–31)
CREAT SERPL-MCNC: 0.71 MG/DL — SIGNIFICANT CHANGE UP (ref 0.5–1.3)
GLUCOSE SERPL-MCNC: 91 MG/DL — SIGNIFICANT CHANGE UP (ref 70–99)
HCT VFR BLD CALC: 40.8 % — SIGNIFICANT CHANGE UP (ref 34.5–45)
HGB BLD-MCNC: 12.8 G/DL — SIGNIFICANT CHANGE UP (ref 11.5–15.5)
MAGNESIUM SERPL-MCNC: 2.1 MG/DL — SIGNIFICANT CHANGE UP (ref 1.6–2.6)
MCHC RBC-ENTMCNC: 27.6 PG — SIGNIFICANT CHANGE UP (ref 27–34)
MCHC RBC-ENTMCNC: 31.4 GM/DL — LOW (ref 32–36)
MCV RBC AUTO: 87.9 FL — SIGNIFICANT CHANGE UP (ref 80–100)
METHOD TYPE: SIGNIFICANT CHANGE UP
METHOD TYPE: SIGNIFICANT CHANGE UP
NRBC # BLD: 0 /100 WBCS — SIGNIFICANT CHANGE UP
NRBC # FLD: 0 K/UL — SIGNIFICANT CHANGE UP
PHOSPHATE SERPL-MCNC: 2.5 MG/DL — SIGNIFICANT CHANGE UP (ref 2.5–4.5)
PLATELET # BLD AUTO: 244 K/UL — SIGNIFICANT CHANGE UP (ref 150–400)
POTASSIUM SERPL-MCNC: 3.8 MMOL/L — SIGNIFICANT CHANGE UP (ref 3.5–5.3)
POTASSIUM SERPL-SCNC: 3.8 MMOL/L — SIGNIFICANT CHANGE UP (ref 3.5–5.3)
RBC # BLD: 4.64 M/UL — SIGNIFICANT CHANGE UP (ref 3.8–5.2)
RBC # FLD: 14 % — SIGNIFICANT CHANGE UP (ref 10.3–14.5)
SODIUM SERPL-SCNC: 137 MMOL/L — SIGNIFICANT CHANGE UP (ref 135–145)
WBC # BLD: 12.35 K/UL — HIGH (ref 3.8–10.5)
WBC # FLD AUTO: 12.35 K/UL — HIGH (ref 3.8–10.5)

## 2021-01-13 RX ORDER — PANTOPRAZOLE SODIUM 20 MG/1
40 TABLET, DELAYED RELEASE ORAL ONCE
Refills: 0 | Status: COMPLETED | OUTPATIENT
Start: 2021-01-13 | End: 2021-01-13

## 2021-01-13 RX ORDER — OXYCODONE HYDROCHLORIDE 5 MG/1
5 TABLET ORAL EVERY 4 HOURS
Refills: 0 | Status: DISCONTINUED | OUTPATIENT
Start: 2021-01-13 | End: 2021-01-19

## 2021-01-13 RX ORDER — ACETAMINOPHEN 500 MG
650 TABLET ORAL EVERY 6 HOURS
Refills: 0 | Status: DISCONTINUED | OUTPATIENT
Start: 2021-01-13 | End: 2021-01-19

## 2021-01-13 RX ORDER — CALCIUM CARBONATE 500(1250)
1 TABLET ORAL ONCE
Refills: 0 | Status: DISCONTINUED | OUTPATIENT
Start: 2021-01-13 | End: 2021-01-14

## 2021-01-13 RX ADMIN — OXYCODONE HYDROCHLORIDE 5 MILLIGRAM(S): 5 TABLET ORAL at 12:16

## 2021-01-13 RX ADMIN — PIPERACILLIN AND TAZOBACTAM 25 GRAM(S): 4; .5 INJECTION, POWDER, LYOPHILIZED, FOR SOLUTION INTRAVENOUS at 14:28

## 2021-01-13 RX ADMIN — ENOXAPARIN SODIUM 40 MILLIGRAM(S): 100 INJECTION SUBCUTANEOUS at 12:17

## 2021-01-13 RX ADMIN — PANTOPRAZOLE SODIUM 40 MILLIGRAM(S): 20 TABLET, DELAYED RELEASE ORAL at 18:58

## 2021-01-13 RX ADMIN — PIPERACILLIN AND TAZOBACTAM 25 GRAM(S): 4; .5 INJECTION, POWDER, LYOPHILIZED, FOR SOLUTION INTRAVENOUS at 21:12

## 2021-01-13 RX ADMIN — Medication 400 MILLIGRAM(S): at 05:46

## 2021-01-13 RX ADMIN — SODIUM CHLORIDE 100 MILLILITER(S): 9 INJECTION, SOLUTION INTRAVENOUS at 00:00

## 2021-01-13 RX ADMIN — PIPERACILLIN AND TAZOBACTAM 25 GRAM(S): 4; .5 INJECTION, POWDER, LYOPHILIZED, FOR SOLUTION INTRAVENOUS at 06:08

## 2021-01-13 RX ADMIN — Medication 650 MILLIGRAM(S): at 18:32

## 2021-01-13 RX ADMIN — Medication 650 MILLIGRAM(S): at 12:17

## 2021-01-13 NOTE — PROGRESS NOTE ADULT - SUBJECTIVE AND OBJECTIVE BOX
A TEAM Surgery Progress Note  Patient is a 75y old  Female who presents with a chief complaint of abdominal surgery (12 Jan 2021 10:51)      INTERVAL EVENTS: Patient is POD#2 s/p LAR and takedown No acute events overnight.  SUBJECTIVE: Patient seen and examined at bedside with surgical team, patient without complaints. Denies fever, chills, CP, SOB nausea, vomiting, abdominal pain.    REVIEW OF SYSTEMS:  Constitutional: No fevers or chills. No malaise or weakness.  EENT: No vision changes. No ear pain. No nasal congestion or rhinitis. No throat pain or dysphagia.  Respiratory: No cough, wheezing, or SOB. No hemoptysis.  Cardiovascular: No chest pain or palpitations.  Gastrointestinal: No abdominal pain. No nausea, vomiting, diarrhea or constipation. No hematochezia. No melena.  Genitourinary: No dysuria, hematuria, or frequency.  Neurologic: No numbness or tingling. No weakness.  Skin: No rashes or pruritus.     OBJECTIVE:    Vital Signs Last 24 Hrs  T(C): 36.8 (13 Jan 2021 05:40), Max: 36.9 (12 Jan 2021 21:44)  T(F): 98.2 (13 Jan 2021 05:40), Max: 98.5 (12 Jan 2021 21:44)  HR: 68 (13 Jan 2021 05:40) (65 - 78)  BP: 130/75 (13 Jan 2021 05:40) (125/74 - 142/78)  BP(mean): --  RR: 17 (13 Jan 2021 05:40) (16 - 18)  SpO2: 100% (13 Jan 2021 05:40) (100% - 100%)I&O's Detail    12 Jan 2021 07:01  -  13 Jan 2021 07:00  --------------------------------------------------------  IN:  Total IN: 0 mL    OUT:    Bulb (mL): 150 mL    Indwelling Catheter - Urethral (mL): 3050 mL  Total OUT: 3200 mL    Total NET: -3200 mL      MEDICATIONS  (STANDING):  acetaminophen   Tablet .. 650 milliGRAM(s) Oral every 6 hours  enoxaparin Injectable 40 milliGRAM(s) SubCutaneous daily  lactated ringers. 1000 milliLiter(s) (100 mL/Hr) IV Continuous <Continuous>  piperacillin/tazobactam IVPB.. 3.375 Gram(s) IV Intermittent every 8 hours    MEDICATIONS  (PRN):  oxyCODONE    IR 5 milliGRAM(s) Oral every 4 hours PRN Moderate Pain (4 - 6)      PHYSICAL EXAM:  Constitutional: A&Ox3, NAD  Respiratory: Unlabored breathing  Abdomen: Soft, nondistended, NTTP. No rebound or guarding.  Extremities: WWP, MOYA spontaneously    LABS:                        12.8   12.35 )-----------( 244      ( 13 Jan 2021 08:03 )             40.8     01-13    137  |  101  |  6<L>  ----------------------------<  91  3.8   |  24  |  0.71    Ca    9.1      13 Jan 2021 08:03  Phos  2.5     01-13  Mg     2.1     01-13                IMAGING:     A TEAM Surgery Progress Note  Patient is a 75y old  Female who presents with a chief complaint of abdominal surgery (12 Jan 2021 10:51)      INTERVAL EVENTS: Patient is POD#2 s/p LAR and takedown colovesical fistula. No acute events overnight.    SUBJECTIVE: Patient seen and examined at bedside with surgical team, patient without complaints. Abdominal pain is tolerable. Denies nausea nor vomiting, tolerating sips of liquids. Has not yet been OOB. Denies fever, chills, CP, SOB.     REVIEW OF SYSTEMS:  Constitutional: No fevers or chills. No malaise or weakness.  EENT: No vision changes. No ear pain. No nasal congestion or rhinitis. No throat pain or dysphagia.  Respiratory: No cough, wheezing, or SOB. No hemoptysis.  Cardiovascular: No chest pain or palpitations.  Gastrointestinal: No abdominal pain. No nausea, vomiting, diarrhea or constipation. No hematochezia. No melena.  Genitourinary: No dysuria, hematuria, or frequency.  Neurologic: No numbness or tingling. No weakness.  Skin: No rashes or pruritus.     OBJECTIVE:    Vital Signs Last 24 Hrs  T(C): 36.8 (13 Jan 2021 05:40), Max: 36.9 (12 Jan 2021 21:44)  T(F): 98.2 (13 Jan 2021 05:40), Max: 98.5 (12 Jan 2021 21:44)  HR: 68 (13 Jan 2021 05:40) (65 - 78)  BP: 130/75 (13 Jan 2021 05:40) (125/74 - 142/78)  BP(mean): --  RR: 17 (13 Jan 2021 05:40) (16 - 18)  SpO2: 100% (13 Jan 2021 05:40) (100% - 100%)I&O's Detail    12 Jan 2021 07:01  -  13 Jan 2021 07:00  --------------------------------------------------------  IN:  Total IN: 0 mL    OUT:    Bulb (mL): 150 mL    Indwelling Catheter - Urethral (mL): 3050 mL  Total OUT: 3200 mL    Total NET: -3200 mL      MEDICATIONS  (STANDING):  acetaminophen   Tablet .. 650 milliGRAM(s) Oral every 6 hours  enoxaparin Injectable 40 milliGRAM(s) SubCutaneous daily  lactated ringers. 1000 milliLiter(s) (100 mL/Hr) IV Continuous <Continuous>  piperacillin/tazobactam IVPB.. 3.375 Gram(s) IV Intermittent every 8 hours    MEDICATIONS  (PRN):  oxyCODONE    IR 5 milliGRAM(s) Oral every 4 hours PRN Moderate Pain (4 - 6)      PHYSICAL EXAM:  Constitutional: A&Ox3, NAD  Respiratory: Unlabored breathing  Abdomen: Soft, nondistended, NTTP. No rebound or guarding. Incisions C/D/I. MARLON with serosanguinous drainage.   Extremities: WWP, MOYA spontaneously    LABS:                        12.8   12.35 )-----------( 244      ( 13 Jan 2021 08:03 )             40.8     01-13    137  |  101  |  6<L>  ----------------------------<  91  3.8   |  24  |  0.71    Ca    9.1      13 Jan 2021 08:03  Phos  2.5     01-13  Mg     2.1     01-13                IMAGING:

## 2021-01-13 NOTE — PROGRESS NOTE ADULT - ASSESSMENT
76 yo female with HLD, diverticulitis, colovesicular fistula s/p laparoscopic LAR 1/11, recovering well on floors.     PLAN:   - Pain control PRN  - Diet: NPO with sips  - continue IVF  - C/w Timmons (will need CT cystogram prior to d/c)  - Monitor I&Os   - c/w Zosyn, await OR cx sensitivities  - DVT ppx: Lovenox  - Apppreciate PT evaluation: Dispo home without PT needs    A team surgery   [id number]

## 2021-01-13 NOTE — PROGRESS NOTE ADULT - SUBJECTIVE AND OBJECTIVE BOX
Patient is a 75y old  Female who presents with a chief complaint of abdominal surgery (12 Jan 2021 10:51)      DATE OF SERVICE: 01-13-21 @ 11:21    SUBJECTIVE / OVERNIGHT EVENTS: overnight events noted    ROS:  Resp: No cough no sputum production  CVS: No chest pain no palpitations no orthopnea  GI: no N/V/D  : no dysuria, no hematuria  Neuro: no weakness no paresthesias  Heme: No petechiae no easy bruising  Msk: No joint pain no swelling  Skin: No rash no itching        MEDICATIONS  (STANDING):  acetaminophen   Tablet .. 650 milliGRAM(s) Oral every 6 hours  enoxaparin Injectable 40 milliGRAM(s) SubCutaneous daily  lactated ringers. 1000 milliLiter(s) (100 mL/Hr) IV Continuous <Continuous>  piperacillin/tazobactam IVPB.. 3.375 Gram(s) IV Intermittent every 8 hours    MEDICATIONS  (PRN):  oxyCODONE    IR 5 milliGRAM(s) Oral every 4 hours PRN Moderate Pain (4 - 6)        CAPILLARY BLOOD GLUCOSE        I&O's Summary    12 Jan 2021 07:01  -  13 Jan 2021 07:00  --------------------------------------------------------  IN: 0 mL / OUT: 3200 mL / NET: -3200 mL    13 Jan 2021 07:01  -  13 Jan 2021 11:21  --------------------------------------------------------  IN: 0 mL / OUT: 25 mL / NET: -25 mL        Vital Signs Last 24 Hrs  T(C): 36.8 (13 Jan 2021 05:40), Max: 36.9 (12 Jan 2021 21:44)  T(F): 98.2 (13 Jan 2021 05:40), Max: 98.5 (12 Jan 2021 21:44)  HR: 68 (13 Jan 2021 05:40) (65 - 78)  BP: 130/75 (13 Jan 2021 05:40) (125/74 - 142/78)  BP(mean): --  RR: 17 (13 Jan 2021 05:40) (16 - 18)  SpO2: 100% (13 Jan 2021 05:40) (100% - 100%)    PHYSICAL EXAM:   HEENT: KYLE EOMI  Neck: Supple, no JVD  Lungs: no wheeze, no crackles  CVS: S1 S2 no M/R/G  Abdomen: minimal appropriate tenderness, BS present  + MARLON drain  Neuro: AO x 3 non focal  Skin: warm, dry  Ext: no edema  Msk: no joint swelling or deformities      LABS:                        12.8   12.35 )-----------( 244      ( 13 Jan 2021 08:03 )             40.8     01-13    137  |  101  |  6<L>  ----------------------------<  91  3.8   |  24  |  0.71    Ca    9.1      13 Jan 2021 08:03  Phos  2.5     01-13  Mg     2.1     01-13                  All consultant(s) notes reviewed and care discussed with other providers        Contact Number, Dr Davis [phone]

## 2021-01-13 NOTE — PHYSICAL THERAPY INITIAL EVALUATION ADULT - SIT-TO-STAND BALANCE
Orders entered for 2 units PRBCs with referral to [facility] for transfusion per communication from ELIER Fan RN, MADISON Hayes  
fair plus

## 2021-01-13 NOTE — PROGRESS NOTE ADULT - ASSESSMENT
76 yo female presents to [facility] for preop evaluation for laparoscopic low anterior resection possible stoma repair colovesical fistula now s/p uneventful surgery

## 2021-01-13 NOTE — PHYSICAL THERAPY INITIAL EVALUATION ADULT - PERTINENT HX OF CURRENT PROBLEM, REHAB EVAL
74 yo female presents to [facility] for preop evaluation for laparoscopic low anterior resection possible stoma repair colovesical fistula. Patient with complaints of difficulty with bowel movement, blood in stool when she wipes and right lower back pack and was evaluated at [facility].  Patient had diagnostic imaging and diagnosed with diverticulitis of large intestine with perforation and abscess without bleeding.

## 2021-01-13 NOTE — PHYSICAL THERAPY INITIAL EVALUATION ADULT - ADDITIONAL COMMENTS
Pt lives with her  in apartment, there is flight to enter but pt can use elevator if wanted. Pt was independent in all ADL prior to admission. Pt denies using assistive device prior to admission.

## 2021-01-13 NOTE — PROVIDER CONTACT NOTE (CRITICAL VALUE NOTIFICATION) - SITUATION
Gram Stain Numerous polymorphonuclear Leukocytes  Few gram variable rods  few gram (+) cocci in pairs  all seen by oil power field

## 2021-01-14 ENCOUNTER — TRANSCRIPTION ENCOUNTER (OUTPATIENT)
Age: 76
End: 2021-01-14

## 2021-01-14 LAB
ANION GAP SERPL CALC-SCNC: 14 MMOL/L — SIGNIFICANT CHANGE UP (ref 7–14)
BUN SERPL-MCNC: 9 MG/DL — SIGNIFICANT CHANGE UP (ref 7–23)
CALCIUM SERPL-MCNC: 8.8 MG/DL — SIGNIFICANT CHANGE UP (ref 8.4–10.5)
CHLORIDE SERPL-SCNC: 103 MMOL/L — SIGNIFICANT CHANGE UP (ref 98–107)
CHOLEST SERPL-MCNC: 153 MG/DL — SIGNIFICANT CHANGE UP
CO2 SERPL-SCNC: 21 MMOL/L — LOW (ref 22–31)
CREAT SERPL-MCNC: 0.68 MG/DL — SIGNIFICANT CHANGE UP (ref 0.5–1.3)
GLUCOSE SERPL-MCNC: 71 MG/DL — SIGNIFICANT CHANGE UP (ref 70–99)
HCT VFR BLD CALC: 38.6 % — SIGNIFICANT CHANGE UP (ref 34.5–45)
HDLC SERPL-MCNC: 52 MG/DL — SIGNIFICANT CHANGE UP
HGB BLD-MCNC: 12 G/DL — SIGNIFICANT CHANGE UP (ref 11.5–15.5)
LIPID PNL WITH DIRECT LDL SERPL: 75 MG/DL — SIGNIFICANT CHANGE UP
MAGNESIUM SERPL-MCNC: 1.8 MG/DL — SIGNIFICANT CHANGE UP (ref 1.6–2.6)
MCHC RBC-ENTMCNC: 27.8 PG — SIGNIFICANT CHANGE UP (ref 27–34)
MCHC RBC-ENTMCNC: 31.1 GM/DL — LOW (ref 32–36)
MCV RBC AUTO: 89.6 FL — SIGNIFICANT CHANGE UP (ref 80–100)
NON HDL CHOLESTEROL: 101 MG/DL — SIGNIFICANT CHANGE UP
NRBC # BLD: 0 /100 WBCS — SIGNIFICANT CHANGE UP
NRBC # FLD: 0 K/UL — SIGNIFICANT CHANGE UP
PHOSPHATE SERPL-MCNC: 2.4 MG/DL — LOW (ref 2.5–4.5)
PLATELET # BLD AUTO: 232 K/UL — SIGNIFICANT CHANGE UP (ref 150–400)
POTASSIUM SERPL-MCNC: 3.8 MMOL/L — SIGNIFICANT CHANGE UP (ref 3.5–5.3)
POTASSIUM SERPL-SCNC: 3.8 MMOL/L — SIGNIFICANT CHANGE UP (ref 3.5–5.3)
RBC # BLD: 4.31 M/UL — SIGNIFICANT CHANGE UP (ref 3.8–5.2)
RBC # FLD: 13.9 % — SIGNIFICANT CHANGE UP (ref 10.3–14.5)
SODIUM SERPL-SCNC: 138 MMOL/L — SIGNIFICANT CHANGE UP (ref 135–145)
TRIGL SERPL-MCNC: 132 MG/DL — SIGNIFICANT CHANGE UP
WBC # BLD: 11.39 K/UL — HIGH (ref 3.8–10.5)
WBC # FLD AUTO: 11.39 K/UL — HIGH (ref 3.8–10.5)

## 2021-01-14 RX ORDER — FLUCONAZOLE 150 MG/1
200 TABLET ORAL EVERY 24 HOURS
Refills: 0 | Status: DISCONTINUED | OUTPATIENT
Start: 2021-01-15 | End: 2021-01-19

## 2021-01-14 RX ORDER — ERTAPENEM SODIUM 1 G/1
1000 INJECTION, POWDER, LYOPHILIZED, FOR SOLUTION INTRAMUSCULAR; INTRAVENOUS ONCE
Refills: 0 | Status: COMPLETED | OUTPATIENT
Start: 2021-01-14 | End: 2021-01-14

## 2021-01-14 RX ORDER — MAGNESIUM SULFATE 500 MG/ML
2 VIAL (ML) INJECTION ONCE
Refills: 0 | Status: COMPLETED | OUTPATIENT
Start: 2021-01-14 | End: 2021-01-14

## 2021-01-14 RX ORDER — FLUCONAZOLE 150 MG/1
TABLET ORAL
Refills: 0 | Status: DISCONTINUED | OUTPATIENT
Start: 2021-01-14 | End: 2021-01-19

## 2021-01-14 RX ORDER — SODIUM CHLORIDE 9 MG/ML
1000 INJECTION, SOLUTION INTRAVENOUS
Refills: 0 | Status: DISCONTINUED | OUTPATIENT
Start: 2021-01-14 | End: 2021-01-16

## 2021-01-14 RX ORDER — POTASSIUM PHOSPHATE, MONOBASIC POTASSIUM PHOSPHATE, DIBASIC 236; 224 MG/ML; MG/ML
15 INJECTION, SOLUTION INTRAVENOUS ONCE
Refills: 0 | Status: COMPLETED | OUTPATIENT
Start: 2021-01-14 | End: 2021-01-14

## 2021-01-14 RX ORDER — FLUCONAZOLE 150 MG/1
200 TABLET ORAL ONCE
Refills: 0 | Status: COMPLETED | OUTPATIENT
Start: 2021-01-14 | End: 2021-01-14

## 2021-01-14 RX ORDER — ERTAPENEM SODIUM 1 G/1
INJECTION, POWDER, LYOPHILIZED, FOR SOLUTION INTRAMUSCULAR; INTRAVENOUS
Refills: 0 | Status: DISCONTINUED | OUTPATIENT
Start: 2021-01-14 | End: 2021-01-19

## 2021-01-14 RX ORDER — ERTAPENEM SODIUM 1 G/1
1000 INJECTION, POWDER, LYOPHILIZED, FOR SOLUTION INTRAMUSCULAR; INTRAVENOUS EVERY 24 HOURS
Refills: 0 | Status: DISCONTINUED | OUTPATIENT
Start: 2021-01-15 | End: 2021-01-19

## 2021-01-14 RX ADMIN — Medication 650 MILLIGRAM(S): at 18:23

## 2021-01-14 RX ADMIN — POTASSIUM PHOSPHATE, MONOBASIC POTASSIUM PHOSPHATE, DIBASIC 62.5 MILLIMOLE(S): 236; 224 INJECTION, SOLUTION INTRAVENOUS at 11:52

## 2021-01-14 RX ADMIN — FLUCONAZOLE 100 MILLIGRAM(S): 150 TABLET ORAL at 11:41

## 2021-01-14 RX ADMIN — OXYCODONE HYDROCHLORIDE 5 MILLIGRAM(S): 5 TABLET ORAL at 11:52

## 2021-01-14 RX ADMIN — PIPERACILLIN AND TAZOBACTAM 25 GRAM(S): 4; .5 INJECTION, POWDER, LYOPHILIZED, FOR SOLUTION INTRAVENOUS at 05:24

## 2021-01-14 RX ADMIN — Medication 50 GRAM(S): at 11:51

## 2021-01-14 RX ADMIN — SODIUM CHLORIDE 100 MILLILITER(S): 9 INJECTION, SOLUTION INTRAVENOUS at 11:52

## 2021-01-14 RX ADMIN — Medication 650 MILLIGRAM(S): at 11:57

## 2021-01-14 RX ADMIN — SODIUM CHLORIDE 50 MILLILITER(S): 9 INJECTION, SOLUTION INTRAVENOUS at 21:05

## 2021-01-14 RX ADMIN — ERTAPENEM SODIUM 120 MILLIGRAM(S): 1 INJECTION, POWDER, LYOPHILIZED, FOR SOLUTION INTRAMUSCULAR; INTRAVENOUS at 11:57

## 2021-01-14 RX ADMIN — ENOXAPARIN SODIUM 40 MILLIGRAM(S): 100 INJECTION SUBCUTANEOUS at 11:58

## 2021-01-14 RX ADMIN — Medication 650 MILLIGRAM(S): at 02:39

## 2021-01-14 NOTE — DISCHARGE NOTE PROVIDER - NSDCMRMEDTOKEN_GEN_ALL_CORE_FT
simvastatin 20 mg oral tablet: 1 tab(s) orally once a day (at bedtime)   ciprofloxacin 500 mg oral tablet: 1 tab(s) orally 2 times a day MDD:2  Diflucan 200 mg oral tablet: 1 tab(s) orally once a day MDD:1  simvastatin 20 mg oral tablet: 1 tab(s) orally once a day (at bedtime)   Bactrim  mg-160 mg oral tablet: 1 milligram(s) orally 2 times a day   Diflucan 200 mg oral tablet: 1 tab(s) orally once a day MDD:1  simvastatin 20 mg oral tablet: 1 tab(s) orally once a day (at bedtime)

## 2021-01-14 NOTE — DISCHARGE NOTE PROVIDER - HOSPITAL COURSE
This patient is a 74 yo female who presented to [facility] on 1/11/21 for laparoscopic low anterior resection possible stoma repair colovesical fistula with Dr. Renner. Patient with complaints of difficulty with bowel movement, blood in stool when she wipes and right lower back pack and was evaluated at [facility].  Patient had diagnostic imaging and diagnosed with diverticulitis of large intestine with perforation and abscess without bleeding.  Patient reports she continues to have difficulty with bowel movements but no longer has blood in stool. Patient underwent laparoscopic LAR as scheduled. Fistula noted draining to bladder. Timmons catheter left in place at the end ofthe case. Patient tolerated the procedure well, without complication. Patient remained hemodynamically stable in the PACU and transferred to the surgical floor. Diet was restarted and advanced as tolerated. Pain control was transitioned from IV to PO pain meds.   On POD#... patient had CT pelvis cystogram which revealed:     At this time, patient is currently ambulating, voiding, tolerating a regular diet. Pain well controlled on PO pain meds. Patient has been deemed stable for discharge home with follow up as an outpatient.    This patient is a 74 yo female who presented to [facility] on 1/11/21 for laparoscopic low anterior resection possible stoma repair colovesical fistula with Dr. Renner. Patient with complaints of difficulty with bowel movement, blood in stool when she wipes and right lower back pack and was evaluated at [facility].  Patient had diagnostic imaging and diagnosed with diverticulitis of large intestine with perforation and abscess without bleeding.  Patient reports she continues to have difficulty with bowel movements but no longer has blood in stool. Patient underwent laparoscopic LAR as scheduled. Fistula noted draining to bladder. Timmons catheter left in place at the end of the case. Patient tolerated the procedure well, without complication. Patient remained hemodynamically stable in the PACU and transferred to the surgical floor. Diet was restarted and advanced as tolerated. Pain control was transitioned from IV to PO pain meds.   On POD#7 patient had CT pelvis cystogram which revealed: no extravasation of contrast in the bladder and no fistula was appreciated. Timmons was DC'd and patient passed her TOV thereafter. Her MARLON drain was removed prior to DC as well.    At this time, patient is currently ambulating, voiding, tolerating a regular diet. Pain well controlled on PO pain meds. Patient has been deemed stable for discharge home with follow up as an outpatient.

## 2021-01-14 NOTE — PROGRESS NOTE ADULT - ASSESSMENT
76 yo female with HLD, diverticulitis, colovesicular fistula s/p laparoscopic LAR 1/11, recovering well on floors.     PLAN:   - Pain control PRN  - Diet: NPO with sips  - Monitor for GI function  - continue IVF  - C/w Timmons (will need CT cystogram prior to d/c)  - Monitor I&Os   - c/w Zosyn, OR cx showed sensitivity  - DVT ppx: Lovenox  - Appreciate PT evaluation: Dispo home without PT needs    A team surgery   [id number]

## 2021-01-14 NOTE — DISCHARGE NOTE PROVIDER - CARE PROVIDER_API CALL
Court Renner  COLON/RECTAL SURGERY  [address], [address]  [place] [address]  Phone: [phone]  Fax: [phone]  Follow Up Time: 2 weeks

## 2021-01-14 NOTE — DISCHARGE NOTE PROVIDER - NSDCCPCAREPLAN_GEN_ALL_CORE_FT
PRINCIPAL DISCHARGE DIAGNOSIS  Diagnosis: Diverticulitis  Assessment and Plan of Treatment: DRAIN CARE: Cuong Stern drain (MARLON Drain) It is important that you measure and record the fluid that is in it (output) on an output record sheet.  Please bring the output record sheet to your follow-up appointment. - Keep the drain secured to your clothing with a safety pin at all times to avoid accidental displacement. - Monitor the insertion site for redness, pain, swelling, or purulent drainage. - You may shower with the drain. Wash around the drain with soap and water, pat dry, and reapply dressing. - If you noticed a sudden change in the color or amount of fluid in the drain, please contact your surgeon’s office. - Your drain will be removed at an outpatient follow up appointment.   WOUND CARE:  Please keep incisions clean and dry. Please do not Scrub or rub incisions. Do not use lotion or powder on incisions.   BATHING: You may shower and/or sponge bathe. You may use warm soapy water in the shower and rinse, pat dry.  ACTIVITY: No heavy lifting or straining. Otherwise, you may return to your usual level of physical activity. If you are taking narcotic pain medication DO NOT drive a car, operate machinery or make important decisions.  DIET: Continue a low residue/low fiber diet.  NOTIFY YOUR SURGEON IF YOU HAVE: any bleeding that does not stop, any pus draining from your wound(s), any fever (over 100.4 F) persistent nausea/vomiting, or if your pain is not controlled on your discharge pain medications, unable to urinate.  Please follow up with your primary care physician in one week regarding your hospitalization, bring copies of your discharge paperwork.  Please follow up with your surgeon, Dr. Renner. Call to make an appointment [phone].      SECONDARY DISCHARGE DIAGNOSES  Diagnosis: History of diverticulosis  Assessment and Plan of Treatment:      PRINCIPAL DISCHARGE DIAGNOSIS  Diagnosis: Diverticulitis  Assessment and Plan of Treatment: WOUND CARE:  Please keep incisions clean and dry. Please do not Scrub or rub incisions. Do not use lotion or powder on incisions. Staples will be removed at your follow up visit as an outpatient.  BATHING: You may shower and/or sponge bathe. You may use warm soapy water in the shower and rinse, pat dry.  ACTIVITY: No heavy lifting or straining. Otherwise, you may return to your usual level of physical activity. If you are taking narcotic pain medication DO NOT drive a car, operate machinery or make important decisions.  DIET: Continue a low residue/low fiber diet.  PAIN: You may take 650mg of Tylenol every 6 hours as needed for pain relief.  NOTIFY YOUR SURGEON IF YOU HAVE: any bleeding that does not stop, any pus draining from your wound(s), any fever (over 100.4 F) persistent nausea/vomiting, or if your pain is not controlled on your discharge pain medications, unable to urinate.  Please follow up with your primary care physician in one week regarding your hospitalization, bring copies of your discharge paperwork.  Please follow up with your surgeon, Dr. Renner. Call to make an appointment [phone].      SECONDARY DISCHARGE DIAGNOSES  Diagnosis: History of diverticulosis  Assessment and Plan of Treatment:

## 2021-01-14 NOTE — PROGRESS NOTE ADULT - ASSESSMENT
75 year old female POD #3 s/p LAR with takedown of colovesical fistula. Recovering appropriately.     Pt seen and examined with Dr. Renner:   - Diet: advanced to CLD   - Pain control: tylenol and oxycodone   - OOB and ambulating   - Timmons stays in place   - Added diflucan and changed abx to ertapenem due to cx results   - dvt ppx: venodynes and lovenox

## 2021-01-14 NOTE — PROGRESS NOTE ADULT - ASSESSMENT
74 yo female presents to [facility] for preop evaluation for laparoscopic low anterior resection possible stoma repair colovesical fistula now s/p uneventful surgery

## 2021-01-14 NOTE — PROGRESS NOTE ADULT - SUBJECTIVE AND OBJECTIVE BOX
INTERVAL HPI/OVERNIGHT EVENTS: Patient seen and examined, resting comfortably at bedside awake and alert. No acute events overnight. Patient denies nausea, vomiting. Admits to having bowel movements. Patient has been out of bed and ambulating. Denies fever, chills, SOB, or chest pain. Pain well controlled.      STATUS POST:  lower anterior resection with takedown of colovesical fistula     POST OPERATIVE DAY #: 3      MEDICATIONS  (STANDING):  acetaminophen   Tablet .. 650 milliGRAM(s) Oral every 6 hours  calcium carbonate    500 mG (Tums) Chewable 1 Tablet(s) Chew once  dextrose 5% + lactated ringers. 1000 milliLiter(s) (50 mL/Hr) IV Continuous <Continuous>  enoxaparin Injectable 40 milliGRAM(s) SubCutaneous daily  ertapenem  IVPB      fluconAZOLE IVPB        MEDICATIONS  (PRN):  oxyCODONE    IR 5 milliGRAM(s) Oral every 4 hours PRN Moderate Pain (4 - 6)      Vital Signs Last 24 Hrs  T(C): 36.8 (14 Jan 2021 14:15), Max: 36.9 (14 Jan 2021 02:07)  T(F): 98.3 (14 Jan 2021 14:15), Max: 98.5 (14 Jan 2021 02:07)  HR: 80 (14 Jan 2021 14:15) (63 - 87)  BP: 132/71 (14 Jan 2021 14:15) (132/71 - 157/76)  BP(mean): --  RR: 15 (14 Jan 2021 14:15) (15 - 18)  SpO2: 100% (14 Jan 2021 14:15) (95% - 100%)    PHYSICAL EXAM:  Constitutional: NAD   Respiratory: breathing comfortably on room air   Gastrointestinal: abdomen soft, nontender, non distended. MARLON drain with SS fluid.   Genitourinary: ramirez in place with clear urine             I&O's Detail    13 Jan 2021 07:01  -  14 Jan 2021 07:00  --------------------------------------------------------  IN:    IV PiggyBack: 200 mL    Lactated Ringers: 900 mL    Oral Fluid: 150 mL  Total IN: 1250 mL    OUT:    Bulb (mL): 102 mL    Indwelling Catheter - Urethral (mL): 1475 mL  Total OUT: 1577 mL    Total NET: -327 mL      14 Jan 2021 07:01  -  14 Jan 2021 15:38  --------------------------------------------------------  IN:    IV PiggyBack: 350 mL  Total IN: 350 mL    OUT:    Bulb (mL): 25 mL    Indwelling Catheter - Urethral (mL): 550 mL  Total OUT: 575 mL    Total NET: -225 mL          LABS:                        12.0   11.39 )-----------( 232      ( 14 Jan 2021 08:35 )             38.6     01-14    138  |  103  |  9   ----------------------------<  71  3.8   |  21<L>  |  0.68    Ca    8.8      14 Jan 2021 08:35  Phos  2.4     01-14  Mg     1.8     01-14

## 2021-01-14 NOTE — PROGRESS NOTE ADULT - SUBJECTIVE AND OBJECTIVE BOX
Patient is a 75y old  Female who presents with a chief complaint of abdominal surgery (13 Jan 2021 11:20)      DATE OF SERVICE: 01-14-21 @ 11:04    SUBJECTIVE / OVERNIGHT EVENTS: overnight events noted    ROS:  Resp: No cough no sputum production  CVS: No chest pain no palpitations no orthopnea  GI: no N/V no BM  : no dysuria, no hematuria  Neuro: no weakness no paresthesias  Heme: No petechiae no easy bruising  Msk: No joint pain no swelling  Skin: No rash no itching        MEDICATIONS  (STANDING):  acetaminophen   Tablet .. 650 milliGRAM(s) Oral every 6 hours  calcium carbonate    500 mG (Tums) Chewable 1 Tablet(s) Chew once  enoxaparin Injectable 40 milliGRAM(s) SubCutaneous daily  ertapenem  IVPB      ertapenem  IVPB 1000 milliGRAM(s) IV Intermittent once  fluconAZOLE IVPB      fluconAZOLE IVPB 200 milliGRAM(s) IV Intermittent once  lactated ringers. 1000 milliLiter(s) (100 mL/Hr) IV Continuous <Continuous>  magnesium sulfate  IVPB 2 Gram(s) IV Intermittent once  potassium phosphate IVPB 15 milliMole(s) IV Intermittent once    MEDICATIONS  (PRN):  oxyCODONE    IR 5 milliGRAM(s) Oral every 4 hours PRN Moderate Pain (4 - 6)        CAPILLARY BLOOD GLUCOSE        I&O's Summary    13 Jan 2021 07:01  -  14 Jan 2021 07:00  --------------------------------------------------------  IN: 1250 mL / OUT: 1577 mL / NET: -327 mL        Vital Signs Last 24 Hrs  T(C): 36.8 (14 Jan 2021 10:34), Max: 36.9 (13 Jan 2021 14:24)  T(F): 98.3 (14 Jan 2021 10:34), Max: 98.5 (13 Jan 2021 14:24)  HR: 87 (14 Jan 2021 10:34) (63 - 87)  BP: 146/79 (14 Jan 2021 10:34) (134/82 - 157/76)  BP(mean): --  RR: 15 (14 Jan 2021 10:34) (15 - 18)  SpO2: 100% (14 Jan 2021 10:34) (95% - 100%)    PHYSICAL EXAM:   HEENT: KYLE EOMI  Neck: Supple, no JVD  Lungs: no wheeze, no crackles  CVS: S1 S2 no M/R/G  Abdomen: mild tenderness  Neuro: AO x 3 non focal  Skin: warm, dry  Ext: no edema      LABS:                        12.0   11.39 )-----------( 232      ( 14 Jan 2021 08:35 )             38.6     01-14    138  |  103  |  9   ----------------------------<  71  3.8   |  21<L>  |  0.68    Ca    8.8      14 Jan 2021 08:35  Phos  2.4     01-14  Mg     1.8     01-14                  All consultant(s) notes reviewed and care discussed with other providers        Contact Number, Dr Davis [phone]

## 2021-01-14 NOTE — PROGRESS NOTE ADULT - SUBJECTIVE AND OBJECTIVE BOX
A TEAM Surgery Progress Note  Patient is a 75y old  Female who presents with a chief complaint of abdominal surgery (12 Jan 2021 10:51)      INTERVAL EVENTS: Patient is POD#3 s/p LAR and takedown colovesical fistula. No acute events overnight.    SUBJECTIVE: Patient seen and examined at bedside with surgical team. Reports one episode of vomiting immediately after taking PO medication. Abdominal pain is appropriate and tolerable.  No nausea. Tolerating sips of liquids. No flatus, no BMs. Ambulating down hallway. Denies fever, chills, CP, SOB.     REVIEW OF SYSTEMS:  Constitutional: No fevers or chills. No malaise or weakness.  EENT: No vision changes. No ear pain. No nasal congestion or rhinitis. No throat pain or dysphagia.  Respiratory: No cough, wheezing, or SOB. No hemoptysis.  Cardiovascular: No chest pain or palpitations.  Gastrointestinal: No abdominal pain. No nausea, vomiting, diarrhea or constipation. No hematochezia. No melena.  Genitourinary: No dysuria, hematuria, or frequency.  Neurologic: No numbness or tingling. No weakness.  Skin: No rashes or pruritus.     OBJECTIVE:    Vital Signs Last 24 Hrs  T(C): 36.7 (14 Jan 2021 05:24), Max: 36.9 (13 Jan 2021 14:24)  T(F): 98.1 (14 Jan 2021 05:24), Max: 98.5 (13 Jan 2021 14:24)  HR: 69 (14 Jan 2021 05:24) (63 - 87)  BP: 157/76 (14 Jan 2021 05:24) (134/82 - 157/76)  BP(mean): --  RR: 18 (14 Jan 2021 05:24) (18 - 18)  SpO2: 95% (14 Jan 2021 05:24) (95% - 98%)      I&O's Detail    13 Jan 2021 07:01  -  14 Jan 2021 07:00  --------------------------------------------------------  IN:    IV PiggyBack: 200 mL    Lactated Ringers: 900 mL    Oral Fluid: 150 mL  Total IN: 1250 mL    OUT:    Bulb (mL): 102 mL    Indwelling Catheter - Urethral (mL): 1475 mL  Total OUT: 1577 mL    Total NET: -327 mL        MEDICATIONS  (STANDING):  acetaminophen   Tablet .. 650 milliGRAM(s) Oral every 6 hours  calcium carbonate    500 mG (Tums) Chewable 1 Tablet(s) Chew once  enoxaparin Injectable 40 milliGRAM(s) SubCutaneous daily  ertapenem  IVPB      fluconAZOLE IVPB      lactated ringers. 1000 milliLiter(s) (100 mL/Hr) IV Continuous <Continuous>    MEDICATIONS  (PRN):  oxyCODONE    IR 5 milliGRAM(s) Oral every 4 hours PRN Moderate Pain (4 - 6)      PHYSICAL EXAM:  Constitutional: A&Ox3, NAD  Respiratory: Unlabored breathing  Abdomen: Soft, nondistended, NTTP. No rebound or guarding. Incisions C/D/I. MARLON with serosanguinous drainage.   Extremities: WWP, MOYA spontaneously      LABS:                        12.0   11.39 )-----------( 232      ( 14 Jan 2021 08:35 )             38.6     01-13    137  |  101  |  6<L>  ----------------------------<  91  3.8   |  24  |  0.71    Ca    9.1      13 Jan 2021 08:03  Phos  2.5     01-13  Mg     2.1     01-13      IMAGING:

## 2021-01-15 LAB
ANION GAP SERPL CALC-SCNC: 10 MMOL/L — SIGNIFICANT CHANGE UP (ref 7–14)
BUN SERPL-MCNC: 6 MG/DL — LOW (ref 7–23)
CALCIUM SERPL-MCNC: 8.8 MG/DL — SIGNIFICANT CHANGE UP (ref 8.4–10.5)
CHLORIDE SERPL-SCNC: 104 MMOL/L — SIGNIFICANT CHANGE UP (ref 98–107)
CO2 SERPL-SCNC: 24 MMOL/L — SIGNIFICANT CHANGE UP (ref 22–31)
CREAT SERPL-MCNC: 0.73 MG/DL — SIGNIFICANT CHANGE UP (ref 0.5–1.3)
GLUCOSE SERPL-MCNC: 88 MG/DL — SIGNIFICANT CHANGE UP (ref 70–99)
HCT VFR BLD CALC: 38.5 % — SIGNIFICANT CHANGE UP (ref 34.5–45)
HGB BLD-MCNC: 12 G/DL — SIGNIFICANT CHANGE UP (ref 11.5–15.5)
MAGNESIUM SERPL-MCNC: 2.1 MG/DL — SIGNIFICANT CHANGE UP (ref 1.6–2.6)
MCHC RBC-ENTMCNC: 28.1 PG — SIGNIFICANT CHANGE UP (ref 27–34)
MCHC RBC-ENTMCNC: 31.2 GM/DL — LOW (ref 32–36)
MCV RBC AUTO: 90.2 FL — SIGNIFICANT CHANGE UP (ref 80–100)
NRBC # BLD: 0 /100 WBCS — SIGNIFICANT CHANGE UP
NRBC # FLD: 0 K/UL — SIGNIFICANT CHANGE UP
PHOSPHATE SERPL-MCNC: 2.5 MG/DL — SIGNIFICANT CHANGE UP (ref 2.5–4.5)
PLATELET # BLD AUTO: 250 K/UL — SIGNIFICANT CHANGE UP (ref 150–400)
POTASSIUM SERPL-MCNC: 3.5 MMOL/L — SIGNIFICANT CHANGE UP (ref 3.5–5.3)
POTASSIUM SERPL-SCNC: 3.5 MMOL/L — SIGNIFICANT CHANGE UP (ref 3.5–5.3)
RBC # BLD: 4.27 M/UL — SIGNIFICANT CHANGE UP (ref 3.8–5.2)
RBC # FLD: 13.8 % — SIGNIFICANT CHANGE UP (ref 10.3–14.5)
SODIUM SERPL-SCNC: 138 MMOL/L — SIGNIFICANT CHANGE UP (ref 135–145)
SURGICAL PATHOLOGY STUDY: SIGNIFICANT CHANGE UP
WBC # BLD: 8.5 K/UL — SIGNIFICANT CHANGE UP (ref 3.8–10.5)
WBC # FLD AUTO: 8.5 K/UL — SIGNIFICANT CHANGE UP (ref 3.8–10.5)

## 2021-01-15 RX ORDER — SODIUM,POTASSIUM PHOSPHATES 278-250MG
1 POWDER IN PACKET (EA) ORAL
Refills: 0 | Status: COMPLETED | OUTPATIENT
Start: 2021-01-15 | End: 2021-01-16

## 2021-01-15 RX ORDER — SIMVASTATIN 20 MG/1
20 TABLET, FILM COATED ORAL AT BEDTIME
Refills: 0 | Status: DISCONTINUED | OUTPATIENT
Start: 2021-01-15 | End: 2021-01-19

## 2021-01-15 RX ADMIN — ERTAPENEM SODIUM 120 MILLIGRAM(S): 1 INJECTION, POWDER, LYOPHILIZED, FOR SOLUTION INTRAMUSCULAR; INTRAVENOUS at 10:38

## 2021-01-15 RX ADMIN — Medication 1 TABLET(S): at 09:59

## 2021-01-15 RX ADMIN — ENOXAPARIN SODIUM 40 MILLIGRAM(S): 100 INJECTION SUBCUTANEOUS at 09:59

## 2021-01-15 RX ADMIN — Medication 1 TABLET(S): at 18:05

## 2021-01-15 RX ADMIN — FLUCONAZOLE 100 MILLIGRAM(S): 150 TABLET ORAL at 10:38

## 2021-01-15 RX ADMIN — Medication 650 MILLIGRAM(S): at 18:05

## 2021-01-15 RX ADMIN — SODIUM CHLORIDE 50 MILLILITER(S): 9 INJECTION, SOLUTION INTRAVENOUS at 18:05

## 2021-01-15 RX ADMIN — Medication 650 MILLIGRAM(S): at 09:59

## 2021-01-15 RX ADMIN — Medication 650 MILLIGRAM(S): at 00:47

## 2021-01-15 NOTE — PROGRESS NOTE ADULT - ASSESSMENT
75 year old female POD #3 s/p LAR with takedown of colovesical fistula. Recovering appropriately.     Pt seen and examined with Dr. Renner:   - Diet: advanced to CLD   - Pain control: tylenol and oxycodone   - OOB and ambulating   - Timmons stays in place   - Added diflucan and changed abx to ertapenem due to cx results   - dvt ppx: venodynes and lovenox  75 year old female POD #3 s/p LAR with takedown of colovesical fistula. Recovering appropriately.     Pt seen and examined with Dr. Renner:   - Diet: advanced to fulls   - Pain control: tylenol and oxycodone   - OOB and ambulating   - Timmons stays in place   - cont diflucan and ertapenem due to cx results   - dvt ppx: venodynes and lovenox

## 2021-01-15 NOTE — PROGRESS NOTE ADULT - SUBJECTIVE AND OBJECTIVE BOX
Patient is a 75y old  Female who presents with a chief complaint of surgery (14 Jan 2021 11:03)      DATE OF SERVICE: 01-15-21 @ 11:51    SUBJECTIVE / OVERNIGHT EVENTS: overnight events noted    ROS:  Resp: No cough no sputum production  CVS: No chest pain no palpitations no orthopnea  GI: no N/V/D + BM  : no dysuria, no hematuria  Neuro: no weakness no paresthesias          MEDICATIONS  (STANDING):  acetaminophen   Tablet .. 650 milliGRAM(s) Oral every 6 hours  dextrose 5% + lactated ringers. 1000 milliLiter(s) (50 mL/Hr) IV Continuous <Continuous>  enoxaparin Injectable 40 milliGRAM(s) SubCutaneous daily  ertapenem  IVPB      ertapenem  IVPB 1000 milliGRAM(s) IV Intermittent every 24 hours  fluconAZOLE IVPB      fluconAZOLE IVPB 200 milliGRAM(s) IV Intermittent every 24 hours  potassium phosphate / sodium phosphate Tablet (K-PHOS No. 2) 1 Tablet(s) Oral three times a day with meals  simvastatin 20 milliGRAM(s) Oral at bedtime    MEDICATIONS  (PRN):  oxyCODONE    IR 5 milliGRAM(s) Oral every 4 hours PRN Moderate Pain (4 - 6)        CAPILLARY BLOOD GLUCOSE        I&O's Summary    14 Jan 2021 07:01  -  15 Gene 2021 07:00  --------------------------------------------------------  IN: 1350 mL / OUT: 2120 mL / NET: -770 mL    15 Gene 2021 07:01  -  15 Gene 2021 11:51  --------------------------------------------------------  IN: 0 mL / OUT: 400 mL / NET: -400 mL        Vital Signs Last 24 Hrs  T(C): 36.9 (15 Gene 2021 10:00), Max: 37.1 (15 Gene 2021 04:54)  T(F): 98.5 (15 Gene 2021 10:00), Max: 98.7 (15 Gene 2021 04:54)  HR: 86 (15 Gene 2021 10:00) (62 - 86)  BP: 147/87 (15 Gene 2021 10:00) (132/71 - 154/66)  BP(mean): --  RR: 16 (15 Gene 2021 10:00) (14 - 16)  SpO2: 96% (15 Gene 2021 10:00) (96% - 100%)    PHYSICAL EXAM:   HEENT: KYLE EOMI  Neck: Supple, no JVD  Lungs: no wheeze, no crackles  CVS: S1 S2 no M/R/G  Abdomen: mild tenderness MARLON drain +  Neuro: AO x 3 non focal  Skin: warm, dry  Ext: no edema    LABS:                        12.0   8.50  )-----------( 250      ( 15 Gene 2021 07:36 )             38.5     01-15    138  |  104  |  6<L>  ----------------------------<  88  3.5   |  24  |  0.73    Ca    8.8      15 Gene 2021 07:36  Phos  2.5     01-15  Mg     2.1     01-15                  All consultant(s) notes reviewed and care discussed with other providers        Contact Number, Dr Davis [phone]

## 2021-01-15 NOTE — PROGRESS NOTE ADULT - SUBJECTIVE AND OBJECTIVE BOX
INTERVAL HPI/OVERNIGHT EVENTS: Patient seen and examined, resting comfortably at bedside awake and alert. No acute events overnight. Patient had a few loose BM yesterday, unable to tell if passing gas also. Patient denies nausea, vomiting. Patient has been out of bed and ambulating. Denies fever, chills, SOB, or chest pain. Pain well controlled.      STATUS POST:  lower anterior resection with takedown of colovesical fistula     POST OPERATIVE DAY #: 4    MEDICATIONS  (STANDING):  acetaminophen   Tablet .. 650 milliGRAM(s) Oral every 6 hours  dextrose 5% + lactated ringers. 1000 milliLiter(s) (50 mL/Hr) IV Continuous <Continuous>  enoxaparin Injectable 40 milliGRAM(s) SubCutaneous daily  ertapenem  IVPB      ertapenem  IVPB 1000 milliGRAM(s) IV Intermittent every 24 hours  fluconAZOLE IVPB      fluconAZOLE IVPB 200 milliGRAM(s) IV Intermittent every 24 hours    MEDICATIONS  (PRN):  oxyCODONE    IR 5 milliGRAM(s) Oral every 4 hours PRN Moderate Pain (4 - 6)    Vital Signs Last 24 Hrs  T(C): 37.1 (15 Gene 2021 04:54), Max: 37.1 (15 Gene 2021 04:54)  T(F): 98.7 (15 Gene 2021 04:54), Max: 98.7 (15 Gene 2021 04:54)  HR: 62 (15 Gene 2021 04:54) (62 - 87)  BP: 154/66 (15 Gene 2021 04:54) (132/71 - 154/66)  BP(mean): --  RR: 16 (15 Gene 2021 04:54) (14 - 16)  SpO2: 96% (15 Gene 2021 04:54) (96% - 100%)    PHYSICAL EXAM:  Constitutional: NAD   Respiratory: breathing comfortably on room air   Gastrointestinal: abdomen soft, nontender, non distended. MARLON drain with SS fluid.   Genitourinary: ramirez in place with clear urine     I&O's Detail    14 Jan 2021 07:01  -  15 Gene 2021 07:00  --------------------------------------------------------  IN:    dextrose 5% + lactated ringers: 1000 mL    IV PiggyBack: 350 mL  Total IN: 1350 mL    OUT:    Bulb (mL): 120 mL    Indwelling Catheter - Urethral (mL): 2000 mL  Total OUT: 2120 mL    Total NET: -770 mL        LABS:                                    12.0   8.50  )-----------( 250      ( 15 Gene 2021 07:36 )             38.5   01-15    138  |  104  |  6<L>  ----------------------------<  88  3.5   |  24  |  0.73    Ca    8.8      15 Gene 2021 07:36  Phos  2.5     01-15  Mg     2.1     01-15

## 2021-01-15 NOTE — PROGRESS NOTE ADULT - ASSESSMENT
76 yo female presents to [facility] for preop evaluation for laparoscopic low anterior resection possible stoma repair colovesical fistula now s/p uneventful surgery

## 2021-01-16 DIAGNOSIS — D64.9 ANEMIA, UNSPECIFIED: ICD-10-CM

## 2021-01-16 LAB
ANION GAP SERPL CALC-SCNC: 10 MMOL/L — SIGNIFICANT CHANGE UP (ref 7–14)
BUN SERPL-MCNC: 4 MG/DL — LOW (ref 7–23)
CALCIUM SERPL-MCNC: 9.1 MG/DL — SIGNIFICANT CHANGE UP (ref 8.4–10.5)
CHLORIDE SERPL-SCNC: 107 MMOL/L — SIGNIFICANT CHANGE UP (ref 98–107)
CO2 SERPL-SCNC: 25 MMOL/L — SIGNIFICANT CHANGE UP (ref 22–31)
CREAT SERPL-MCNC: 0.62 MG/DL — SIGNIFICANT CHANGE UP (ref 0.5–1.3)
CULTURE RESULTS: SIGNIFICANT CHANGE UP
GLUCOSE SERPL-MCNC: 100 MG/DL — HIGH (ref 70–99)
HCT VFR BLD CALC: 34.1 % — LOW (ref 34.5–45)
HGB BLD-MCNC: 11.2 G/DL — LOW (ref 11.5–15.5)
MAGNESIUM SERPL-MCNC: 2.1 MG/DL — SIGNIFICANT CHANGE UP (ref 1.6–2.6)
MCHC RBC-ENTMCNC: 27.9 PG — SIGNIFICANT CHANGE UP (ref 27–34)
MCHC RBC-ENTMCNC: 32.8 GM/DL — SIGNIFICANT CHANGE UP (ref 32–36)
MCV RBC AUTO: 85 FL — SIGNIFICANT CHANGE UP (ref 80–100)
NRBC # BLD: 0 /100 WBCS — SIGNIFICANT CHANGE UP
NRBC # FLD: 0 K/UL — SIGNIFICANT CHANGE UP
ORGANISM # SPEC MICROSCOPIC CNT: SIGNIFICANT CHANGE UP
PHOSPHATE SERPL-MCNC: 3.8 MG/DL — SIGNIFICANT CHANGE UP (ref 2.5–4.5)
PLATELET # BLD AUTO: 277 K/UL — SIGNIFICANT CHANGE UP (ref 150–400)
POTASSIUM SERPL-MCNC: 3.6 MMOL/L — SIGNIFICANT CHANGE UP (ref 3.5–5.3)
POTASSIUM SERPL-SCNC: 3.6 MMOL/L — SIGNIFICANT CHANGE UP (ref 3.5–5.3)
RBC # BLD: 4.01 M/UL — SIGNIFICANT CHANGE UP (ref 3.8–5.2)
RBC # FLD: 13.8 % — SIGNIFICANT CHANGE UP (ref 10.3–14.5)
SODIUM SERPL-SCNC: 142 MMOL/L — SIGNIFICANT CHANGE UP (ref 135–145)
SPECIMEN SOURCE: SIGNIFICANT CHANGE UP
WBC # BLD: 7.75 K/UL — SIGNIFICANT CHANGE UP (ref 3.8–10.5)
WBC # FLD AUTO: 7.75 K/UL — SIGNIFICANT CHANGE UP (ref 3.8–10.5)

## 2021-01-16 RX ADMIN — Medication 650 MILLIGRAM(S): at 09:03

## 2021-01-16 RX ADMIN — SODIUM CHLORIDE 50 MILLILITER(S): 9 INJECTION, SOLUTION INTRAVENOUS at 06:51

## 2021-01-16 RX ADMIN — Medication 650 MILLIGRAM(S): at 02:02

## 2021-01-16 RX ADMIN — Medication 650 MILLIGRAM(S): at 15:00

## 2021-01-16 RX ADMIN — Medication 650 MILLIGRAM(S): at 21:53

## 2021-01-16 RX ADMIN — SIMVASTATIN 20 MILLIGRAM(S): 20 TABLET, FILM COATED ORAL at 21:53

## 2021-01-16 RX ADMIN — ERTAPENEM SODIUM 120 MILLIGRAM(S): 1 INJECTION, POWDER, LYOPHILIZED, FOR SOLUTION INTRAMUSCULAR; INTRAVENOUS at 09:23

## 2021-01-16 RX ADMIN — Medication 1 TABLET(S): at 09:03

## 2021-01-16 RX ADMIN — FLUCONAZOLE 100 MILLIGRAM(S): 150 TABLET ORAL at 09:59

## 2021-01-16 RX ADMIN — ENOXAPARIN SODIUM 40 MILLIGRAM(S): 100 INJECTION SUBCUTANEOUS at 10:04

## 2021-01-16 NOTE — PROGRESS NOTE ADULT - SUBJECTIVE AND OBJECTIVE BOX
Patient is a 75y old  Female who presents with a chief complaint of abdominal surgery (15 Gene 2021 11:49)      DATE OF SERVICE: 01-16-21 @ 11:14    SUBJECTIVE / OVERNIGHT EVENTS: overnight events noted    ROS:  Resp: No cough no sputum production  CVS: No chest pain no palpitations no orthopnea  GI: no N/V/D  : no dysuria, no hematuria  Neuro: no weakness no paresthesias  Heme: No petechiae no easy bruising  Msk: No joint pain no swelling  Skin: No rash no itching        MEDICATIONS  (STANDING):  acetaminophen   Tablet .. 650 milliGRAM(s) Oral every 6 hours  dextrose 5% + lactated ringers. 1000 milliLiter(s) (50 mL/Hr) IV Continuous <Continuous>  enoxaparin Injectable 40 milliGRAM(s) SubCutaneous daily  ertapenem  IVPB      ertapenem  IVPB 1000 milliGRAM(s) IV Intermittent every 24 hours  fluconAZOLE IVPB      fluconAZOLE IVPB 200 milliGRAM(s) IV Intermittent every 24 hours  simvastatin 20 milliGRAM(s) Oral at bedtime    MEDICATIONS  (PRN):  oxyCODONE    IR 5 milliGRAM(s) Oral every 4 hours PRN Moderate Pain (4 - 6)        CAPILLARY BLOOD GLUCOSE        I&O's Summary    15 Gene 2021 07:01  -  16 Jan 2021 07:00  --------------------------------------------------------  IN: 350 mL / OUT: 1770 mL / NET: -1420 mL    16 Jan 2021 07:01  -  16 Jan 2021 11:14  --------------------------------------------------------  IN: 0 mL / OUT: 265 mL / NET: -265 mL        Vital Signs Last 24 Hrs  T(C): 36.6 (16 Jan 2021 09:01), Max: 37 (16 Jan 2021 05:32)  T(F): 97.9 (16 Jan 2021 09:01), Max: 98.6 (16 Jan 2021 05:32)  HR: 60 (16 Jan 2021 09:01) (60 - 85)  BP: 161/74 (16 Jan 2021 09:01) (139/89 - 161/74)  BP(mean): --  RR: 18 (16 Jan 2021 09:01) (14 - 18)  SpO2: 96% (16 Jan 2021 09:01) (96% - 100%)    PHYSICAL EXAM:   HEENT: KYLE EOMI  Neck: Supple, no JVD  Lungs: no wheeze, no crackles  CVS: S1 S2 no M/R/G  Abdomen: mild tenderness MARLON drain +  Neuro: AO x 3 non focal  Skin: warm, dry  Ext: no edema    LABS:                        11.2   7.75  )-----------( 277      ( 16 Jan 2021 07:37 )             34.1     01-16    142  |  107  |  4<L>  ----------------------------<  100<H>  3.6   |  25  |  0.62    Ca    9.1      16 Jan 2021 07:37  Phos  3.8     01-16  Mg     2.1     01-16                  All consultant(s) notes reviewed and care discussed with other providers        Contact Number, Dr Davis [phone]

## 2021-01-16 NOTE — PROGRESS NOTE ADULT - ASSESSMENT
75 year old female POD #5 s/p LAR with takedown of colovesical fistula. Recovering appropriately.     Plan  - Diet: c/w full liquids  - Pain control: tylenol and oxycodone   - OOB and ambulating   - Timmons stays in place; plan for cystogram Monday AM   - cont diflucan and ertapenem due to cx results   - dvt ppx: venodynes and lovenox     A Team Surgery [id number] 75 year old female POD #5 s/p LAR with takedown of colovesical fistula. Recovering appropriately.     Plan  - Diet: c/w full liquids  - Pain control: tylenol and oxycodone   - OOB and ambulating   - Timmons stays in place; plan for cystogram Monday AM   - cont diflucan and ertapenem due to cx results   - Abdominal Aquacel dressing removed 1/15  - dvt ppx: venodynes and lovenox     A Team Surgery [id number]

## 2021-01-16 NOTE — PROGRESS NOTE ADULT - ASSESSMENT
74 yo female presents to [facility] for preop evaluation for laparoscopic low anterior resection possible stoma repair colovesical fistula now s/p uneventful surgery

## 2021-01-17 LAB
ANION GAP SERPL CALC-SCNC: 12 MMOL/L — SIGNIFICANT CHANGE UP (ref 7–14)
BUN SERPL-MCNC: 6 MG/DL — LOW (ref 7–23)
CALCIUM SERPL-MCNC: 9.2 MG/DL — SIGNIFICANT CHANGE UP (ref 8.4–10.5)
CHLORIDE SERPL-SCNC: 106 MMOL/L — SIGNIFICANT CHANGE UP (ref 98–107)
CO2 SERPL-SCNC: 23 MMOL/L — SIGNIFICANT CHANGE UP (ref 22–31)
CREAT SERPL-MCNC: 0.69 MG/DL — SIGNIFICANT CHANGE UP (ref 0.5–1.3)
GLUCOSE SERPL-MCNC: 99 MG/DL — SIGNIFICANT CHANGE UP (ref 70–99)
HCT VFR BLD CALC: 38.7 % — SIGNIFICANT CHANGE UP (ref 34.5–45)
HGB BLD-MCNC: 12.1 G/DL — SIGNIFICANT CHANGE UP (ref 11.5–15.5)
MAGNESIUM SERPL-MCNC: 2 MG/DL — SIGNIFICANT CHANGE UP (ref 1.6–2.6)
MCHC RBC-ENTMCNC: 27.6 PG — SIGNIFICANT CHANGE UP (ref 27–34)
MCHC RBC-ENTMCNC: 31.3 GM/DL — LOW (ref 32–36)
MCV RBC AUTO: 88.4 FL — SIGNIFICANT CHANGE UP (ref 80–100)
NRBC # BLD: 0 /100 WBCS — SIGNIFICANT CHANGE UP
NRBC # FLD: 0 K/UL — SIGNIFICANT CHANGE UP
PHOSPHATE SERPL-MCNC: 3.8 MG/DL — SIGNIFICANT CHANGE UP (ref 2.5–4.5)
PLATELET # BLD AUTO: 319 K/UL — SIGNIFICANT CHANGE UP (ref 150–400)
POTASSIUM SERPL-MCNC: 3.8 MMOL/L — SIGNIFICANT CHANGE UP (ref 3.5–5.3)
POTASSIUM SERPL-SCNC: 3.8 MMOL/L — SIGNIFICANT CHANGE UP (ref 3.5–5.3)
RBC # BLD: 4.38 M/UL — SIGNIFICANT CHANGE UP (ref 3.8–5.2)
RBC # FLD: 13.7 % — SIGNIFICANT CHANGE UP (ref 10.3–14.5)
SODIUM SERPL-SCNC: 141 MMOL/L — SIGNIFICANT CHANGE UP (ref 135–145)
WBC # BLD: 7.47 K/UL — SIGNIFICANT CHANGE UP (ref 3.8–10.5)
WBC # FLD AUTO: 7.47 K/UL — SIGNIFICANT CHANGE UP (ref 3.8–10.5)

## 2021-01-17 RX ADMIN — ENOXAPARIN SODIUM 40 MILLIGRAM(S): 100 INJECTION SUBCUTANEOUS at 11:19

## 2021-01-17 RX ADMIN — FLUCONAZOLE 100 MILLIGRAM(S): 150 TABLET ORAL at 12:59

## 2021-01-17 RX ADMIN — Medication 650 MILLIGRAM(S): at 11:18

## 2021-01-17 RX ADMIN — Medication 650 MILLIGRAM(S): at 05:16

## 2021-01-17 RX ADMIN — ERTAPENEM SODIUM 120 MILLIGRAM(S): 1 INJECTION, POWDER, LYOPHILIZED, FOR SOLUTION INTRAMUSCULAR; INTRAVENOUS at 11:14

## 2021-01-17 RX ADMIN — Medication 650 MILLIGRAM(S): at 17:17

## 2021-01-17 RX ADMIN — SIMVASTATIN 20 MILLIGRAM(S): 20 TABLET, FILM COATED ORAL at 23:34

## 2021-01-17 NOTE — PROGRESS NOTE ADULT - SUBJECTIVE AND OBJECTIVE BOX
INTERVAL HPI/OVERNIGHT EVENTS: Patient seen and examined, resting comfortably at bedside awake and alert. No acute events overnight. Patient having bowel movements and passing gas. Tolerating regular diet, denies nausea, vomiting. Patient has been out of bed and ambulating. Denies fever, chills, SOB, or chest pain. Pain well controlled.      STATUS POST:  lower anterior resection with takedown of colovesical fistula     POST OPERATIVE DAY #: 6    MEDICATIONS  (STANDING):  acetaminophen   Tablet .. 650 milliGRAM(s) Oral every 6 hours  enoxaparin Injectable 40 milliGRAM(s) SubCutaneous daily  ertapenem  IVPB      ertapenem  IVPB 1000 milliGRAM(s) IV Intermittent every 24 hours  fluconAZOLE IVPB      fluconAZOLE IVPB 200 milliGRAM(s) IV Intermittent every 24 hours  simvastatin 20 milliGRAM(s) Oral at bedtime    MEDICATIONS  (PRN):  oxyCODONE    IR 5 milliGRAM(s) Oral every 4 hours PRN Moderate Pain (4 - 6)    Vital Signs Last 24 Hrs  T(C): 36.6 (17 Jan 2021 05:16), Max: 36.6 (16 Jan 2021 15:50)  T(F): 97.8 (17 Jan 2021 05:16), Max: 97.8 (16 Jan 2021 15:50)  HR: 64 (17 Jan 2021 05:16) (64 - 68)  BP: 151/73 (17 Jan 2021 05:16) (147/80 - 151/73)  BP(mean): --  RR: 18 (17 Jan 2021 05:16) (18 - 18)  SpO2: 97% (17 Jan 2021 05:16) (97% - 100%)    PHYSICAL EXAM:  Constitutional: NAD   Respiratory: breathing comfortably on room air   Gastrointestinal: abdomen soft, nontender, non distended. MARLON drain with SS fluid.   Genitourinary: ramirez in place with clear urine     I&O's Detail    16 Jan 2021 07:01  -  17 Jan 2021 07:00  --------------------------------------------------------  IN:  Total IN: 0 mL    OUT:    Bulb (mL): 207 mL    Indwelling Catheter - Urethral (mL): 1800 mL  Total OUT: 2007 mL    Total NET: -2007 mL      17 Jan 2021 07:01  -  17 Jan 2021 09:15  --------------------------------------------------------  IN:  Total IN: 0 mL    OUT:    Bulb (mL): 20 mL    Indwelling Catheter - Urethral (mL): 450 mL  Total OUT: 470 mL    Total NET: -470 mL    Labs:                                    12.1   7.47  )-----------( 319      ( 17 Jan 2021 06:39 )             38.7   01-17    141  |  106  |  6<L>  ----------------------------<  99  3.8   |  23  |  0.69    Ca    9.2      17 Jan 2021 06:39  Phos  3.8     01-17  Mg     2.0     01-17    Culture - Abscess with Gram Stain (01.11.21 @ 15:48)    -  Amikacin: S <=16    -  Amikacin: S <=16    -  Amoxicillin/Clavulanic Acid: R >16/8    -  Amoxicillin/Clavulanic Acid: R >16/8    -  Ampicillin: R <=8 These ampicillin results predict results for amoxicillin    -  Ampicillin: R >16 These ampicillin results predict results for amoxicillin    -  Ampicillin/Sulbactam: R 8/4 Enterobacter, Citrobacter, and Serratia may develop resistance during prolonged therapy (3-4 days)    -  Ampicillin/Sulbactam: R 8/4 Enterobacter, Citrobacter, and Serratia may develop resistance during prolonged therapy (3-4 days)    -  Aztreonam: S <=4    -  Aztreonam: S <=4    -  Cefazolin: R >16 Enterobacter, Citrobacter, and Serratia may develop resistance during prolonged therapy (3-4 days)    -  Cefazolin: R >16 Enterobacter, Citrobacter, and Serratia may develop resistance during prolonged therapy (3-4 days)    -  Cefepime: S <=2    -  Cefepime: S <=2    -  Cefoxitin: R >16    -  Cefoxitin: R >16    -  Ceftriaxone: S <=1 Enterobacter, Citrobacter, and Serratia may develop resistance during prolonged therapy    -  Ceftriaxone: S <=1 Enterobacter, Citrobacter, and Serratia may develop resistance during prolonged therapy    -  Ciprofloxacin: R 1    -  Ciprofloxacin: S <=0.25    -  Ertapenem: S <=0.5    -  Ertapenem: S <=0.5    -  Gentamicin: S <=2    -  Gentamicin: S <=2    -  Imipenem: S <=1    -  Imipenem: S <=1    -  Levofloxacin: R 2    -  Levofloxacin: S <=0.5    -  Meropenem: S <=1    -  Meropenem: S <=1    -  Piperacillin/Tazobactam: S <=8    -  Piperacillin/Tazobactam: S <=8    -  Tobramycin: S <=2    -  Tobramycin: S <=2    -  Trimethoprim/Sulfamethoxazole: S <=0.5/9.5    -  Trimethoprim/Sulfamethoxazole: S <=0.5/9.5    Specimen Source: .Abscess PELVIC ABCESS    Culture Results:   Rare Enterobacter cloacae complex  Rare Citrobacter freundii complex  Rare Candida albicans "Susceptibilities not performed"    Organism Identification: Enterobacter cloacae complex  Citrobacter freundii complex    Organism: Enterobacter cloacae complex    Organism: Citrobacter freundii complex    Method Type: MARVIN    Method Type: MARVIN

## 2021-01-17 NOTE — PROGRESS NOTE ADULT - SUBJECTIVE AND OBJECTIVE BOX
INTERVAL HPI/OVERNIGHT EVENTS: Patient seen and examined, resting comfortably at bedside awake and alert. No acute events overnight. Patient reports tolerating diet without nausea, vomiting. Admits to passing flatus and having bowel movements. Patient with ramirez in place. Pt has been out of bed and ambulating. Denies fever, chills, SOB, or chest pain. Pain well controlled.          MEDICATIONS  (STANDING):  acetaminophen   Tablet .. 650 milliGRAM(s) Oral every 6 hours  enoxaparin Injectable 40 milliGRAM(s) SubCutaneous daily  ertapenem  IVPB      ertapenem  IVPB 1000 milliGRAM(s) IV Intermittent every 24 hours  fluconAZOLE IVPB      fluconAZOLE IVPB 200 milliGRAM(s) IV Intermittent every 24 hours  simvastatin 20 milliGRAM(s) Oral at bedtime    MEDICATIONS  (PRN):  oxyCODONE    IR 5 milliGRAM(s) Oral every 4 hours PRN Moderate Pain (4 - 6)      Vital Signs Last 24 Hrs  T(C): 36.6 (17 Jan 2021 05:16), Max: 36.6 (16 Jan 2021 15:50)  T(F): 97.8 (17 Jan 2021 05:16), Max: 97.8 (16 Jan 2021 15:50)  HR: 64 (17 Jan 2021 05:16) (64 - 68)  BP: 151/73 (17 Jan 2021 05:16) (147/80 - 151/73)  BP(mean): --  RR: 18 (17 Jan 2021 05:16) (18 - 18)  SpO2: 97% (17 Jan 2021 05:16) (97% - 100%)    PHYSICAL EXAM:  Constitutional: NAD, awake and alert   Respiratory: breathing comfortably on room air   Gastrointestinal: abdomen soft, appropriately tender, nondistended. Drain with SS output. Dressing c/d/i  Genitourinary: ramirez in place with clear urine output            I&O's Detail    16 Jan 2021 07:01  -  17 Jan 2021 07:00  --------------------------------------------------------  IN:  Total IN: 0 mL    OUT:    Bulb (mL): 207 mL    Indwelling Catheter - Urethral (mL): 1800 mL  Total OUT: 2007 mL    Total NET: -2007 mL      17 Jan 2021 07:01  -  17 Jan 2021 10:49  --------------------------------------------------------  IN:  Total IN: 0 mL    OUT:    Bulb (mL): 20 mL    Indwelling Catheter - Urethral (mL): 450 mL  Total OUT: 470 mL    Total NET: -470 mL          LABS:                        12.1   7.47  )-----------( 319      ( 17 Jan 2021 06:39 )             38.7     01-17    141  |  106  |  6<L>  ----------------------------<  99  3.8   |  23  |  0.69    Ca    9.2      17 Jan 2021 06:39  Phos  3.8     01-17  Mg     2.0     01-17

## 2021-01-17 NOTE — PROGRESS NOTE ADULT - SUBJECTIVE AND OBJECTIVE BOX
Patient is a 75y old  Female who presents with a chief complaint of abdominal surgery (16 Jan 2021 11:14)      DATE OF SERVICE: 01-17-21 @ 10:06    SUBJECTIVE / OVERNIGHT EVENTS: overnight events noted    ROS:  Resp: No cough no sputum production  CVS: No chest pain no palpitations no orthopnea  GI: no N/V/D  : no dysuria, no hematuria  Neuro: no weakness no paresthesias  Heme: No petechiae no easy bruising  Msk: No joint pain no swelling  Skin: No rash no itching        MEDICATIONS  (STANDING):  acetaminophen   Tablet .. 650 milliGRAM(s) Oral every 6 hours  enoxaparin Injectable 40 milliGRAM(s) SubCutaneous daily  ertapenem  IVPB      ertapenem  IVPB 1000 milliGRAM(s) IV Intermittent every 24 hours  fluconAZOLE IVPB      fluconAZOLE IVPB 200 milliGRAM(s) IV Intermittent every 24 hours  simvastatin 20 milliGRAM(s) Oral at bedtime    MEDICATIONS  (PRN):  oxyCODONE    IR 5 milliGRAM(s) Oral every 4 hours PRN Moderate Pain (4 - 6)        CAPILLARY BLOOD GLUCOSE        I&O's Summary    16 Jan 2021 07:01  -  17 Jan 2021 07:00  --------------------------------------------------------  IN: 0 mL / OUT: 2007 mL / NET: -2007 mL    17 Jan 2021 07:01  -  17 Jan 2021 10:06  --------------------------------------------------------  IN: 0 mL / OUT: 470 mL / NET: -470 mL        Vital Signs Last 24 Hrs  T(C): 36.6 (17 Jan 2021 05:16), Max: 36.6 (16 Jan 2021 15:50)  T(F): 97.8 (17 Jan 2021 05:16), Max: 97.8 (16 Jan 2021 15:50)  HR: 64 (17 Jan 2021 05:16) (64 - 68)  BP: 151/73 (17 Jan 2021 05:16) (147/80 - 151/73)  BP(mean): --  RR: 18 (17 Jan 2021 05:16) (18 - 18)  SpO2: 97% (17 Jan 2021 05:16) (97% - 100%)    PHYSICAL EXAM:   HEENT: KYLE EOMI  Neck: Supple, no JVD  Lungs: no wheeze, no crackles  CVS: S1 S2 no M/R/G  Abdomen: mild tenderness MARLON drain +  Neuro: AO x 3 non focal  Skin: warm, dry  Ext: no edema    LABS:                        12.1   7.47  )-----------( 319      ( 17 Jan 2021 06:39 )             38.7     01-17    141  |  106  |  6<L>  ----------------------------<  99  3.8   |  23  |  0.69    Ca    9.2      17 Jan 2021 06:39  Phos  3.8     01-17  Mg     2.0     01-17                  All consultant(s) notes reviewed and care discussed with other providers        Contact Number, Dr Davis [phone]

## 2021-01-17 NOTE — PROGRESS NOTE ADULT - ASSESSMENT
75 year old female POD #6 s/p LAR with takedown of colovesical fistula. Recovering appropriately.     Plan  - Diet: Regular  - Pain control: tylenol and oxycodone   - OOB and ambulating   - Timmons stays in place; plan for cystogram Monday AM   - Pelvic Abscess Cx: E. cloacae, citrobacter freundii sensitive to cefepime and cipro  - cont diflucan and ertapenem, consider changing Monday   - Abdominal Aquacel dressing removed 1/15  - dvt ppx: venodynes and lovenox     A Team Surgery [id number]

## 2021-01-17 NOTE — PROGRESS NOTE ADULT - ASSESSMENT
75 year old female POD #6 s/p LAR with takedown of colovesical fistula. Recovering appropriately.     Plan  - Diet: LRD  - Pain control: tylenol and oxycodone   - Timmons stays in place  - CT cystogram Monday AM   - Continue diflucan and ertapenem   - OOB and ambulating   - dvt ppx: lovenox

## 2021-01-17 NOTE — PROGRESS NOTE ADULT - ASSESSMENT
74 yo female presents to [facility] for preop evaluation for laparoscopic low anterior resection possible stoma repair colovesical fistula now s/p uneventful surgery

## 2021-01-18 LAB
ANION GAP SERPL CALC-SCNC: 14 MMOL/L — SIGNIFICANT CHANGE UP (ref 7–14)
BASOPHILS # BLD AUTO: 0.04 K/UL — SIGNIFICANT CHANGE UP (ref 0–0.2)
BASOPHILS NFR BLD AUTO: 0.5 % — SIGNIFICANT CHANGE UP (ref 0–2)
BUN SERPL-MCNC: 7 MG/DL — SIGNIFICANT CHANGE UP (ref 7–23)
CALCIUM SERPL-MCNC: 9.5 MG/DL — SIGNIFICANT CHANGE UP (ref 8.4–10.5)
CHLORIDE SERPL-SCNC: 105 MMOL/L — SIGNIFICANT CHANGE UP (ref 98–107)
CO2 SERPL-SCNC: 22 MMOL/L — SIGNIFICANT CHANGE UP (ref 22–31)
CREAT SERPL-MCNC: 0.71 MG/DL — SIGNIFICANT CHANGE UP (ref 0.5–1.3)
EOSINOPHIL # BLD AUTO: 0.34 K/UL — SIGNIFICANT CHANGE UP (ref 0–0.5)
EOSINOPHIL NFR BLD AUTO: 4 % — SIGNIFICANT CHANGE UP (ref 0–6)
GLUCOSE SERPL-MCNC: 103 MG/DL — HIGH (ref 70–99)
HCT VFR BLD CALC: 40.8 % — SIGNIFICANT CHANGE UP (ref 34.5–45)
HGB BLD-MCNC: 13 G/DL — SIGNIFICANT CHANGE UP (ref 11.5–15.5)
IANC: 4.48 K/UL — SIGNIFICANT CHANGE UP (ref 1.5–8.5)
IMM GRANULOCYTES NFR BLD AUTO: 0.8 % — SIGNIFICANT CHANGE UP (ref 0–1.5)
LYMPHOCYTES # BLD AUTO: 2.42 K/UL — SIGNIFICANT CHANGE UP (ref 1–3.3)
LYMPHOCYTES # BLD AUTO: 28.6 % — SIGNIFICANT CHANGE UP (ref 13–44)
MAGNESIUM SERPL-MCNC: 2.1 MG/DL — SIGNIFICANT CHANGE UP (ref 1.6–2.6)
MCHC RBC-ENTMCNC: 27.7 PG — SIGNIFICANT CHANGE UP (ref 27–34)
MCHC RBC-ENTMCNC: 31.9 GM/DL — LOW (ref 32–36)
MCV RBC AUTO: 87 FL — SIGNIFICANT CHANGE UP (ref 80–100)
MONOCYTES # BLD AUTO: 1.1 K/UL — HIGH (ref 0–0.9)
MONOCYTES NFR BLD AUTO: 13 % — SIGNIFICANT CHANGE UP (ref 2–14)
NEUTROPHILS # BLD AUTO: 4.48 K/UL — SIGNIFICANT CHANGE UP (ref 1.8–7.4)
NEUTROPHILS NFR BLD AUTO: 53.1 % — SIGNIFICANT CHANGE UP (ref 43–77)
NRBC # BLD: 0 /100 WBCS — SIGNIFICANT CHANGE UP
NRBC # FLD: 0 K/UL — SIGNIFICANT CHANGE UP
PLATELET # BLD AUTO: 362 K/UL — SIGNIFICANT CHANGE UP (ref 150–400)
POTASSIUM SERPL-MCNC: 4.1 MMOL/L — SIGNIFICANT CHANGE UP (ref 3.5–5.3)
POTASSIUM SERPL-SCNC: 4.1 MMOL/L — SIGNIFICANT CHANGE UP (ref 3.5–5.3)
RBC # BLD: 4.69 M/UL — SIGNIFICANT CHANGE UP (ref 3.8–5.2)
RBC # FLD: 13.9 % — SIGNIFICANT CHANGE UP (ref 10.3–14.5)
SODIUM SERPL-SCNC: 141 MMOL/L — SIGNIFICANT CHANGE UP (ref 135–145)
WBC # BLD: 8.45 K/UL — SIGNIFICANT CHANGE UP (ref 3.8–10.5)
WBC # FLD AUTO: 8.45 K/UL — SIGNIFICANT CHANGE UP (ref 3.8–10.5)

## 2021-01-18 PROCEDURE — 74176 CT ABD & PELVIS W/O CONTRAST: CPT | Mod: 26

## 2021-01-18 RX ORDER — FLUCONAZOLE 150 MG/1
1 TABLET ORAL
Qty: 5 | Refills: 0
Start: 2021-01-18 | End: 2021-01-22

## 2021-01-18 RX ORDER — AZTREONAM 2 G
1 VIAL (EA) INJECTION
Qty: 10 | Refills: 0
Start: 2021-01-18 | End: 2021-01-22

## 2021-01-18 RX ORDER — CIPROFLOXACIN LACTATE 400MG/40ML
1 VIAL (ML) INTRAVENOUS
Qty: 10 | Refills: 0
Start: 2021-01-18 | End: 2021-01-22

## 2021-01-18 RX ORDER — AZTREONAM 2 G
1 VIAL (EA) INJECTION
Qty: 8 | Refills: 0
Start: 2021-01-18 | End: 2021-01-21

## 2021-01-18 RX ADMIN — ERTAPENEM SODIUM 120 MILLIGRAM(S): 1 INJECTION, POWDER, LYOPHILIZED, FOR SOLUTION INTRAMUSCULAR; INTRAVENOUS at 09:13

## 2021-01-18 RX ADMIN — ENOXAPARIN SODIUM 40 MILLIGRAM(S): 100 INJECTION SUBCUTANEOUS at 12:21

## 2021-01-18 RX ADMIN — SIMVASTATIN 20 MILLIGRAM(S): 20 TABLET, FILM COATED ORAL at 22:36

## 2021-01-18 RX ADMIN — FLUCONAZOLE 100 MILLIGRAM(S): 150 TABLET ORAL at 11:32

## 2021-01-18 RX ADMIN — Medication 650 MILLIGRAM(S): at 18:53

## 2021-01-18 RX ADMIN — Medication 650 MILLIGRAM(S): at 09:10

## 2021-01-18 NOTE — PROGRESS NOTE ADULT - SUBJECTIVE AND OBJECTIVE BOX
Patient is a 75y old  Female who presents with a chief complaint of abdominal surgery (17 Jan 2021 10:05)      DATE OF SERVICE: 01-18-21 @ 10:43    SUBJECTIVE / OVERNIGHT EVENTS: overnight events noted    ROS:  Resp: No cough no sputum production  CVS: No chest pain no palpitations no orthopnea  GI: no N/V/D  : no dysuria, no hematuria          MEDICATIONS  (STANDING):  acetaminophen   Tablet .. 650 milliGRAM(s) Oral every 6 hours  enoxaparin Injectable 40 milliGRAM(s) SubCutaneous daily  ertapenem  IVPB      ertapenem  IVPB 1000 milliGRAM(s) IV Intermittent every 24 hours  fluconAZOLE IVPB      fluconAZOLE IVPB 200 milliGRAM(s) IV Intermittent every 24 hours  simvastatin 20 milliGRAM(s) Oral at bedtime    MEDICATIONS  (PRN):  oxyCODONE    IR 5 milliGRAM(s) Oral every 4 hours PRN Moderate Pain (4 - 6)        CAPILLARY BLOOD GLUCOSE        I&O's Summary    17 Jan 2021 07:01  -  18 Jan 2021 07:00  --------------------------------------------------------  IN: 0 mL / OUT: 1675 mL / NET: -1675 mL    18 Jan 2021 07:01  -  18 Jan 2021 10:43  --------------------------------------------------------  IN: 0 mL / OUT: 225 mL / NET: -225 mL        Vital Signs Last 24 Hrs  T(C): 37.1 (18 Jan 2021 05:48), Max: 37.1 (18 Jan 2021 05:48)  T(F): 98.8 (18 Jan 2021 05:48), Max: 98.8 (18 Jan 2021 05:48)  HR: 66 (18 Jan 2021 05:48) (62 - 67)  BP: 131/67 (18 Jan 2021 05:48) (131/67 - 143/96)  BP(mean): --  RR: 18 (18 Jan 2021 05:48) (18 - 18)  SpO2: 98% (18 Jan 2021 05:48) (97% - 98%)    PHYSICAL EXAM:   HEENT: KYLE EOMI  Neck: Supple, no JVD  Lungs: no wheeze, no crackles  CVS: S1 S2 no M/R/G  Abdomen: mild tenderness MARLON drain +  Neuro: AO x 3 non focal  Skin: warm, dry  Ext: no edema    LABS:                        12.1   7.47  )-----------( 319      ( 17 Jan 2021 06:39 )             38.7     01-17    141  |  106  |  6<L>  ----------------------------<  99  3.8   |  23  |  0.69    Ca    9.2      17 Jan 2021 06:39  Phos  3.8     01-17  Mg     2.0     01-17                  All consultant(s) notes reviewed and care discussed with other providers        Contact Number, Dr Davis [phone]

## 2021-01-18 NOTE — PROGRESS NOTE ADULT - SUBJECTIVE AND OBJECTIVE BOX
INTERVAL HPI/OVERNIGHT EVENTS: Patient seen and examined, resting comfortably at bedside awake and alert. No acute events overnight. Patient having bowel movements and passing gas. Tolerating regular diet, denies nausea, vomiting. Patient has been out of bed and ambulating. Denies fever, chills, SOB, or chest pain. Pain well controlled.      STATUS POST:  lower anterior resection with takedown of colovesical fistula     POST OPERATIVE DAY #: 7    MEDICATIONS  (STANDING):  acetaminophen   Tablet .. 650 milliGRAM(s) Oral every 6 hours  enoxaparin Injectable 40 milliGRAM(s) SubCutaneous daily  ertapenem  IVPB      ertapenem  IVPB 1000 milliGRAM(s) IV Intermittent every 24 hours  fluconAZOLE IVPB 200 milliGRAM(s) IV Intermittent every 24 hours  fluconAZOLE IVPB      simvastatin 20 milliGRAM(s) Oral at bedtime    MEDICATIONS  (PRN):  oxyCODONE    IR 5 milliGRAM(s) Oral every 4 hours PRN Moderate Pain (4 - 6)      Vital Signs Last 24 Hrs  T(C): 36.8 (17 Jan 2021 21:57), Max: 36.8 (17 Jan 2021 21:57)  T(F): 98.3 (17 Jan 2021 21:57), Max: 98.3 (17 Jan 2021 21:57)  HR: 62 (17 Jan 2021 21:57) (62 - 67)  BP: 143/96 (17 Jan 2021 21:57) (143/82 - 151/73)  BP(mean): --  RR: 18 (17 Jan 2021 21:57) (18 - 18)  SpO2: 97% (17 Jan 2021 21:57) (97% - 98%)      PHYSICAL EXAM:  Constitutional: NAD   Respiratory: breathing comfortably on room air   Gastrointestinal: abdomen soft, nontender, non distended. MARLON drain with SS fluid.   Genitourinary: ramirez in place with clear urine       I&O's Detail    16 Jan 2021 07:01  -  17 Jan 2021 07:00  --------------------------------------------------------  IN:  Total IN: 0 mL    OUT:    Bulb (mL): 207 mL    Indwelling Catheter - Urethral (mL): 1800 mL  Total OUT: 2007 mL    Total NET: -2007 mL      17 Jan 2021 07:01  -  18 Jan 2021 01:13  --------------------------------------------------------  IN:  Total IN: 0 mL    OUT:    Bulb (mL): 75 mL    Indwelling Catheter - Urethral (mL): 1250 mL  Total OUT: 1325 mL    Total NET: -1325 mL        Labs:                                    12.1   7.47  )-----------( 319      ( 17 Jan 2021 06:39 )             38.7   01-17    141  |  106  |  6<L>  ----------------------------<  99  3.8   |  23  |  0.69    Ca    9.2      17 Jan 2021 06:39  Phos  3.8     01-17  Mg     2.0     01-17    Culture - Abscess with Gram Stain (01.11.21 @ 15:48)    -  Amikacin: S <=16    -  Amikacin: S <=16    -  Amoxicillin/Clavulanic Acid: R >16/8    -  Amoxicillin/Clavulanic Acid: R >16/8    -  Ampicillin: R <=8 These ampicillin results predict results for amoxicillin    -  Ampicillin: R >16 These ampicillin results predict results for amoxicillin    -  Ampicillin/Sulbactam: R 8/4 Enterobacter, Citrobacter, and Serratia may develop resistance during prolonged therapy (3-4 days)    -  Ampicillin/Sulbactam: R 8/4 Enterobacter, Citrobacter, and Serratia may develop resistance during prolonged therapy (3-4 days)    -  Aztreonam: S <=4    -  Aztreonam: S <=4    -  Cefazolin: R >16 Enterobacter, Citrobacter, and Serratia may develop resistance during prolonged therapy (3-4 days)    -  Cefazolin: R >16 Enterobacter, Citrobacter, and Serratia may develop resistance during prolonged therapy (3-4 days)    -  Cefepime: S <=2    -  Cefepime: S <=2    -  Cefoxitin: R >16    -  Cefoxitin: R >16    -  Ceftriaxone: S <=1 Enterobacter, Citrobacter, and Serratia may develop resistance during prolonged therapy    -  Ceftriaxone: S <=1 Enterobacter, Citrobacter, and Serratia may develop resistance during prolonged therapy    -  Ciprofloxacin: R 1    -  Ciprofloxacin: S <=0.25    -  Ertapenem: S <=0.5    -  Ertapenem: S <=0.5    -  Gentamicin: S <=2    -  Gentamicin: S <=2    -  Imipenem: S <=1    -  Imipenem: S <=1    -  Levofloxacin: R 2    -  Levofloxacin: S <=0.5    -  Meropenem: S <=1    -  Meropenem: S <=1    -  Piperacillin/Tazobactam: S <=8    -  Piperacillin/Tazobactam: S <=8    -  Tobramycin: S <=2    -  Tobramycin: S <=2    -  Trimethoprim/Sulfamethoxazole: S <=0.5/9.5    -  Trimethoprim/Sulfamethoxazole: S <=0.5/9.5    Specimen Source: .Abscess PELVIC ABCESS    Culture Results:   Rare Enterobacter cloacae complex  Rare Citrobacter freundii complex  Rare Candida albicans "Susceptibilities not performed"    Organism Identification: Enterobacter cloacae complex  Citrobacter freundii complex    Organism: Enterobacter cloacae complex    Organism: Citrobacter freundii complex    Method Type: MARVIN    Method Type: MARVIN             INTERVAL HPI/OVERNIGHT EVENTS: Patient seen and examined, resting comfortably at bedside awake and alert. No acute events overnight. Patient having bowel movements and passing gas. Tolerating regular diet, denies nausea, vomiting. Patient has been out of bed and ambulating. Denies fever, chills, SOB, or chest pain. Pain well controlled.      STATUS POST:  lower anterior resection with takedown of colovesical fistula     POST OPERATIVE DAY #: 7    MEDICATIONS  (STANDING):  acetaminophen   Tablet .. 650 milliGRAM(s) Oral every 6 hours  enoxaparin Injectable 40 milliGRAM(s) SubCutaneous daily  ertapenem  IVPB      ertapenem  IVPB 1000 milliGRAM(s) IV Intermittent every 24 hours  fluconAZOLE IVPB 200 milliGRAM(s) IV Intermittent every 24 hours  fluconAZOLE IVPB      simvastatin 20 milliGRAM(s) Oral at bedtime    MEDICATIONS  (PRN):  oxyCODONE    IR 5 milliGRAM(s) Oral every 4 hours PRN Moderate Pain (4 - 6)      Vital Signs Last 24 Hrs  T(C): 36.8 (17 Jan 2021 21:57), Max: 36.8 (17 Jan 2021 21:57)  T(F): 98.3 (17 Jan 2021 21:57), Max: 98.3 (17 Jan 2021 21:57)  HR: 62 (17 Jan 2021 21:57) (62 - 67)  BP: 143/96 (17 Jan 2021 21:57) (143/82 - 151/73)  BP(mean): --  RR: 18 (17 Jan 2021 21:57) (18 - 18)  SpO2: 97% (17 Jan 2021 21:57) (97% - 98%)      PHYSICAL EXAM:  Constitutional: NAD   Respiratory: breathing comfortably on room air   Gastrointestinal: abdomen soft, nontender, non distended. MARLON drain with SS fluid. Dressing mildly saturated and changed on rounds  Genitourinary: ramirez in place with clear urine       I&O's Detail    16 Jan 2021 07:01  -  17 Jan 2021 07:00  --------------------------------------------------------  IN:  Total IN: 0 mL    OUT:    Bulb (mL): 207 mL    Indwelling Catheter - Urethral (mL): 1800 mL  Total OUT: 2007 mL    Total NET: -2007 mL      17 Jan 2021 07:01  -  18 Jan 2021 01:13  --------------------------------------------------------  IN:  Total IN: 0 mL    OUT:    Bulb (mL): 75 mL    Indwelling Catheter - Urethral (mL): 1250 mL  Total OUT: 1325 mL    Total NET: -1325 mL        Labs:                                    12.1   7.47  )-----------( 319      ( 17 Jan 2021 06:39 )             38.7   01-17    141  |  106  |  6<L>  ----------------------------<  99  3.8   |  23  |  0.69    Ca    9.2      17 Jan 2021 06:39  Phos  3.8     01-17  Mg     2.0     01-17    Culture - Abscess with Gram Stain (01.11.21 @ 15:48)    -  Amikacin: S <=16    -  Amikacin: S <=16    -  Amoxicillin/Clavulanic Acid: R >16/8    -  Amoxicillin/Clavulanic Acid: R >16/8    -  Ampicillin: R <=8 These ampicillin results predict results for amoxicillin    -  Ampicillin: R >16 These ampicillin results predict results for amoxicillin    -  Ampicillin/Sulbactam: R 8/4 Enterobacter, Citrobacter, and Serratia may develop resistance during prolonged therapy (3-4 days)    -  Ampicillin/Sulbactam: R 8/4 Enterobacter, Citrobacter, and Serratia may develop resistance during prolonged therapy (3-4 days)    -  Aztreonam: S <=4    -  Aztreonam: S <=4    -  Cefazolin: R >16 Enterobacter, Citrobacter, and Serratia may develop resistance during prolonged therapy (3-4 days)    -  Cefazolin: R >16 Enterobacter, Citrobacter, and Serratia may develop resistance during prolonged therapy (3-4 days)    -  Cefepime: S <=2    -  Cefepime: S <=2    -  Cefoxitin: R >16    -  Cefoxitin: R >16    -  Ceftriaxone: S <=1 Enterobacter, Citrobacter, and Serratia may develop resistance during prolonged therapy    -  Ceftriaxone: S <=1 Enterobacter, Citrobacter, and Serratia may develop resistance during prolonged therapy    -  Ciprofloxacin: R 1    -  Ciprofloxacin: S <=0.25    -  Ertapenem: S <=0.5    -  Ertapenem: S <=0.5    -  Gentamicin: S <=2    -  Gentamicin: S <=2    -  Imipenem: S <=1    -  Imipenem: S <=1    -  Levofloxacin: R 2    -  Levofloxacin: S <=0.5    -  Meropenem: S <=1    -  Meropenem: S <=1    -  Piperacillin/Tazobactam: S <=8    -  Piperacillin/Tazobactam: S <=8    -  Tobramycin: S <=2    -  Tobramycin: S <=2    -  Trimethoprim/Sulfamethoxazole: S <=0.5/9.5    -  Trimethoprim/Sulfamethoxazole: S <=0.5/9.5    Specimen Source: .Abscess PELVIC ABCESS    Culture Results:   Rare Enterobacter cloacae complex  Rare Citrobacter freundii complex  Rare Candida albicans "Susceptibilities not performed"    Organism Identification: Enterobacter cloacae complex  Citrobacter freundii complex    Organism: Enterobacter cloacae complex    Organism: Citrobacter freundii complex    Method Type: MARVIN    Method Type: MARVIN

## 2021-01-18 NOTE — PROGRESS NOTE ADULT - ASSESSMENT
76 yo female presents to [facility] for preop evaluation for laparoscopic low anterior resection possible stoma repair colovesical fistula now s/p uneventful surgery

## 2021-01-18 NOTE — PROGRESS NOTE ADULT - SUBJECTIVE AND OBJECTIVE BOX
INTERVAL HPI/OVERNIGHT EVENTS: Patient seen and examined, resting comfortably at bedside awake and alert. No acute events overnight. Patient reports tolerating diet without nausea, vomiting. Admits to passing flatus and having bowel movements. Pt has been out of bed and ambulating. Denies fever, chills, SOB, or chest pain. Pain well controlled.        MEDICATIONS  (STANDING):  acetaminophen   Tablet .. 650 milliGRAM(s) Oral every 6 hours  enoxaparin Injectable 40 milliGRAM(s) SubCutaneous daily  ertapenem  IVPB      ertapenem  IVPB 1000 milliGRAM(s) IV Intermittent every 24 hours  fluconAZOLE IVPB      fluconAZOLE IVPB 200 milliGRAM(s) IV Intermittent every 24 hours  simvastatin 20 milliGRAM(s) Oral at bedtime    MEDICATIONS  (PRN):  oxyCODONE    IR 5 milliGRAM(s) Oral every 4 hours PRN Moderate Pain (4 - 6)      Vital Signs Last 24 Hrs  T(C): 37.1 (18 Jan 2021 05:48), Max: 37.1 (18 Jan 2021 05:48)  T(F): 98.8 (18 Jan 2021 05:48), Max: 98.8 (18 Jan 2021 05:48)  HR: 66 (18 Jan 2021 05:48) (62 - 67)  BP: 131/67 (18 Jan 2021 05:48) (131/67 - 143/96)  BP(mean): --  RR: 18 (18 Jan 2021 05:48) (18 - 18)  SpO2: 98% (18 Jan 2021 05:48) (97% - 98%)    PHYSICAL EXAM:  Constitutional: NAD, awake and alert   Respiratory: breathing comfortably on room air   Gastrointestinal: abdomen soft, nontender, non distended. Drain in place with serous output. Dressing c/d/i  Genitourinary: ramirez in place with clear urine outpur             I&O's Detail    17 Jan 2021 07:01  -  18 Jan 2021 07:00  --------------------------------------------------------  IN:  Total IN: 0 mL    OUT:    Bulb (mL): 75 mL    Indwelling Catheter - Urethral (mL): 1600 mL    Oral Fluid: 0 mL  Total OUT: 1675 mL    Total NET: -1675 mL      18 Jan 2021 07:01  -  18 Jan 2021 10:23  --------------------------------------------------------  IN:  Total IN: 0 mL    OUT:    Bulb (mL): 25 mL    Indwelling Catheter - Urethral (mL): 200 mL  Total OUT: 225 mL    Total NET: -225 mL          LABS:                        12.1   7.47  )-----------( 319      ( 17 Jan 2021 06:39 )             38.7     01-17    141  |  106  |  6<L>  ----------------------------<  99  3.8   |  23  |  0.69    Ca    9.2      17 Jan 2021 06:39  Phos  3.8     01-17  Mg     2.0     01-17

## 2021-01-18 NOTE — PROGRESS NOTE ADULT - ASSESSMENT
75 year old female POD #6 s/p LAR with takedown of colovesical fistula. Recovering appropriately.     Plan  - CT Cystogram today   - Diet: Regular  - Pain control: tylenol and oxycodone   - OOB and ambulating   - Timmons d/c pending cystogram   - Pelvic Abscess Cx: E. cloacae, citrobacter freundii sensitive to cefepime and cipro  - cont diflucan and ertapenem, consider changing Monday   - Abdominal Aquacel dressing removed 1/15  - dvt ppx: venodynes and lovenox     A Team Surgery [id number] 75 year old female POD #6 s/p LAR with takedown of colovesical fistula. Recovering appropriately.     Plan  - CT Cystogram today   - Diet: Regular  - Pain control: tylenol and oxycodone   - OOB and ambulating   - Timmons d/c pending cystogram   - Pelvic Abscess Cx: E. cloacae, citrobacter freundii sensitive to cefepime and cipro  - cont diflucan and ertapenem, consider changing Monday   - Abdominal Aquacel dressing removed 1/15  - dvt ppx: venodynes and lovenox     A Team Surgery [id number]

## 2021-01-18 NOTE — PROGRESS NOTE ADULT - ASSESSMENT
75 year old female POD #7 s/p LAR with takedown of colovesical fistula. Recovering appropriately.     Plan  - Diet: LRD  - Pain control: tylenol and oxycodone   - CT Cystogram today, d/c ramirez if okay   - Continue diflucan   - OOB and ambulating   - dvt ppx: lovenox

## 2021-01-19 ENCOUNTER — TRANSCRIPTION ENCOUNTER (OUTPATIENT)
Age: 76
End: 2021-01-19

## 2021-01-19 VITALS — WEIGHT: 209.88 LBS

## 2021-01-19 LAB
ANION GAP SERPL CALC-SCNC: 11 MMOL/L — SIGNIFICANT CHANGE UP (ref 7–14)
BUN SERPL-MCNC: 8 MG/DL — SIGNIFICANT CHANGE UP (ref 7–23)
CALCIUM SERPL-MCNC: 9.5 MG/DL — SIGNIFICANT CHANGE UP (ref 8.4–10.5)
CHLORIDE SERPL-SCNC: 104 MMOL/L — SIGNIFICANT CHANGE UP (ref 98–107)
CO2 SERPL-SCNC: 26 MMOL/L — SIGNIFICANT CHANGE UP (ref 22–31)
CREAT SERPL-MCNC: 0.77 MG/DL — SIGNIFICANT CHANGE UP (ref 0.5–1.3)
GLUCOSE SERPL-MCNC: 113 MG/DL — HIGH (ref 70–99)
HCT VFR BLD CALC: 41.5 % — SIGNIFICANT CHANGE UP (ref 34.5–45)
HGB BLD-MCNC: 12.7 G/DL — SIGNIFICANT CHANGE UP (ref 11.5–15.5)
MAGNESIUM SERPL-MCNC: 2.1 MG/DL — SIGNIFICANT CHANGE UP (ref 1.6–2.6)
MCHC RBC-ENTMCNC: 27.5 PG — SIGNIFICANT CHANGE UP (ref 27–34)
MCHC RBC-ENTMCNC: 30.6 GM/DL — LOW (ref 32–36)
MCV RBC AUTO: 89.8 FL — SIGNIFICANT CHANGE UP (ref 80–100)
NRBC # BLD: 0 /100 WBCS — SIGNIFICANT CHANGE UP
NRBC # FLD: 0 K/UL — SIGNIFICANT CHANGE UP
PHOSPHATE SERPL-MCNC: 3.1 MG/DL — SIGNIFICANT CHANGE UP (ref 2.5–4.5)
PLATELET # BLD AUTO: 370 K/UL — SIGNIFICANT CHANGE UP (ref 150–400)
POTASSIUM SERPL-MCNC: 4.1 MMOL/L — SIGNIFICANT CHANGE UP (ref 3.5–5.3)
POTASSIUM SERPL-SCNC: 4.1 MMOL/L — SIGNIFICANT CHANGE UP (ref 3.5–5.3)
RBC # BLD: 4.62 M/UL — SIGNIFICANT CHANGE UP (ref 3.8–5.2)
RBC # FLD: 13.7 % — SIGNIFICANT CHANGE UP (ref 10.3–14.5)
SODIUM SERPL-SCNC: 141 MMOL/L — SIGNIFICANT CHANGE UP (ref 135–145)
WBC # BLD: 9.22 K/UL — SIGNIFICANT CHANGE UP (ref 3.8–10.5)
WBC # FLD AUTO: 9.22 K/UL — SIGNIFICANT CHANGE UP (ref 3.8–10.5)

## 2021-01-19 RX ORDER — FLUCONAZOLE 150 MG/1
1 TABLET ORAL
Qty: 14 | Refills: 0
Start: 2021-01-19 | End: 2021-02-01

## 2021-01-19 RX ORDER — FLUCONAZOLE 150 MG/1
1 TABLET ORAL
Qty: 5 | Refills: 0
Start: 2021-01-19 | End: 2021-01-23

## 2021-01-19 RX ORDER — AZTREONAM 2 G
1 VIAL (EA) INJECTION
Qty: 28 | Refills: 0
Start: 2021-01-19 | End: 2021-02-01

## 2021-01-19 RX ORDER — AZTREONAM 2 G
1 VIAL (EA) INJECTION
Qty: 10 | Refills: 0
Start: 2021-01-19 | End: 2021-01-23

## 2021-01-19 RX ADMIN — FLUCONAZOLE 100 MILLIGRAM(S): 150 TABLET ORAL at 12:08

## 2021-01-19 RX ADMIN — Medication 650 MILLIGRAM(S): at 12:08

## 2021-01-19 RX ADMIN — ERTAPENEM SODIUM 120 MILLIGRAM(S): 1 INJECTION, POWDER, LYOPHILIZED, FOR SOLUTION INTRAMUSCULAR; INTRAVENOUS at 11:11

## 2021-01-19 RX ADMIN — Medication 650 MILLIGRAM(S): at 05:25

## 2021-01-19 NOTE — PROGRESS NOTE ADULT - PROBLEM SELECTOR PROBLEM 2
Abscess of bladder
Atrial fibrillation
Abscess of bladder
Abscess of bladder
Atrial fibrillation

## 2021-01-19 NOTE — DISCHARGE NOTE NURSING/CASE MANAGEMENT/SOCIAL WORK - PATIENT PORTAL LINK FT
You can access the FollowMyHealth Patient Portal offered by [facility] by registering at the following website: [email or web address]. By joining [employer]’s FollowMyHealth portal, you will also be able to view your health information using other applications (apps) compatible with our system.

## 2021-01-19 NOTE — DIETITIAN INITIAL EVALUATION ADULT. - PERSON TAUGHT/METHOD
verbal instruction/written material/patient instructed/teach back - (Patient repeats in own words)/ask me 3

## 2021-01-19 NOTE — DIETITIAN INITIAL EVALUATION ADULT. - OTHER INFO
Pt. w Hx of HLD, diverticulitis, currently s/p colovesical fistula repair.    Pt. denies food allergies, nausea/vomiting/diarrhea/constipation, or issues with chewing/swallowing.      Reports decrease in appetite/PO intake PTA due to GI discomfort & resultant ~10lb weight loss.  Pt. unable to specify time perios over which weight loss has occurred.  Currently Pt, reports suboptimal PO intake due to dislike of hospital food RDN offered to provide food preferences, however Pt. states she is pending D/C and is looking forward to going home to eat the foods she enjoys.   Discussed prescribed low fiber diet and provided verbal & printed education.  Pt. receptive, asks appropriate question & demonstrates understanding.

## 2021-01-19 NOTE — PROGRESS NOTE ADULT - PROBLEM SELECTOR PLAN 3
no intervention required at this time
no intervention required at this time
continue Ertapenem  s/p low anterior resection  cystogram Monday
no intervention required at this time
cystogram negative   management per surgery
continue Ertapenem  s/p low anterior resection  cystogram today
continue Ertapenem  s/p low anterior resection  fistulogram Monday

## 2021-01-19 NOTE — DIETITIAN INITIAL EVALUATION ADULT. - PERTINENT LABORATORY DATA
01-19 Na141 mmol/L Glu 113 mg/dL<H> K+ 4.1 mmol/L Cr  0.77 mg/dL BUN 8 mg/dL 01-19 Phos 3.1 mg/dL 01-14 Chol 153 mg/dL LDL --    HDL 52 mg/dL Trig 132 mg/dL

## 2021-01-19 NOTE — PROGRESS NOTE ADULT - SUBJECTIVE AND OBJECTIVE BOX
INTERVAL HPI/OVERNIGHT EVENTS: Pt feels great, no complaints. Tolerating diet, positive bowel function    STATUS POST:  LAR, t/d colovesical fistula    POST OPERATIVE DAY #: 8    MEDICATIONS  (STANDING):  acetaminophen   Tablet .. 650 milliGRAM(s) Oral every 6 hours  enoxaparin Injectable 40 milliGRAM(s) SubCutaneous daily  ertapenem  IVPB      ertapenem  IVPB 1000 milliGRAM(s) IV Intermittent every 24 hours  fluconAZOLE IVPB      fluconAZOLE IVPB 200 milliGRAM(s) IV Intermittent every 24 hours  simvastatin 20 milliGRAM(s) Oral at bedtime    MEDICATIONS  (PRN):  oxyCODONE    IR 5 milliGRAM(s) Oral every 4 hours PRN Moderate Pain (4 - 6)      Vital Signs Last 24 Hrs  T(C): 36.8 (18 Jan 2021 21:48), Max: 36.8 (18 Jan 2021 21:48)  T(F): 98.3 (18 Jan 2021 21:48), Max: 98.3 (18 Jan 2021 21:48)  HR: 61 (18 Jan 2021 21:48) (61 - 67)  BP: 134/68 (18 Jan 2021 21:48) (134/68 - 143/73)  BP(mean): --  RR: 18 (18 Jan 2021 21:48) (18 - 19)  SpO2: 96% (18 Jan 2021 21:48) (96% - 97%)  I&O's Detail    18 Jan 2021 07:01  -  19 Jan 2021 07:00  --------------------------------------------------------  IN:  Total IN: 0 mL    OUT:    Bulb (mL): 50 mL    Indwelling Catheter - Urethral (mL): 1150 mL    Voided (mL): 750 mL  Total OUT: 1950 mL    Total NET: -1950 mL          REVIEW OF SYSTEMS:  CONSTITUTIONAL: No weakness, fevers or chills  EYES/ENT: No visual changes;  No vertigo or throat pain   NECK: No pain or stiffness  RESPIRATORY: No cough, wheezing, hemoptysis; No shortness of breath  CARDIOVASCULAR: No chest pain or palpitations  GASTROINTESTINAL: No abdominal or epigastric pain. No nausea, vomiting, or hematemesis; loose BM. No melena or hematochezia.  GENITOURINARY: No dysuria, frequency or hematuria  NEUROLOGICAL: No numbness or weakness  SKIN: No itching, burning, rashes, or lesions   All other review of systems is negative unless indicated above.    Physical Exam:  General: WN/WD NAD  Neurology: A&Ox3, nonfocal, MOYA x 4  Respiratory: CTA B/L  CV: RRR, S1S2, no murmurs, rubs or gallops  Abdominal: Soft, NT, ND +BS, wound c/d/i  Extremities: No edema, + peripheral pulses        LABS:                        12.7   9.22  )-----------( 370      ( 19 Jan 2021 07:58 )             41.5     01-19    141  |  104  |  8   ----------------------------<  113<H>  4.1   |  26  |  0.77    Ca    9.5      19 Jan 2021 07:58  Phos  3.1     01-19  Mg     2.1     01-19            RADIOLOGY & ADDITIONAL STUDIES:

## 2021-01-19 NOTE — PROGRESS NOTE ADULT - ASSESSMENT
75 year old female POD #8 s/p LAR with takedown of colovesical fistula. Recovering appropriately.     Plan  - Pain control PRN  - Diet: Regular  - OOB and ambulating   - Pelvic Abscess Cx: E. cloacae, citrobacter freundii  - ABX upon discharge: 5 days Diflucan and Bactrim  - VTE ppx: venodynes and lovenox   - Discharge home today on oral ABX; f/u with Dr. Renner in 1 week as outpatient    A Team Surgery   [phone]

## 2021-01-19 NOTE — PROGRESS NOTE ADULT - REASON FOR ADMISSION
abdominal  surgery
surgery
abdominal surgery

## 2021-01-19 NOTE — PROGRESS NOTE ADULT - SUBJECTIVE AND OBJECTIVE BOX
A TEAM Surgery Progress Note  Patient is a 75y old  Female who presents with a chief complaint of abdominal surgery (18 Jan 2021 10:42)      INTERVAL EVENTS: Patient is POD#8 s/p LAR with takedown of colovesical fistula. Cystogram yesterday without evidence of colovesicular fistula. No acute events overnight.  SUBJECTIVE: Patient seen and examined at bedside with surgical team, patient without complaints. Abdominal pain is minimal, tolerable with pain medications. Tolerating regular diet. Continues to have bowel function. Voiding spontaneously. Ambulating. Denies fever, chills, CP, SOB nausea, vomiting.     REVIEW OF SYSTEMS:  Constitutional: No fevers or chills. No malaise or weakness.  EENT: No vision changes. No ear pain. No nasal congestion or rhinitis. No throat pain or dysphagia.  Respiratory: No cough, wheezing, or SOB. No hemoptysis.  Cardiovascular: No chest pain or palpitations.  Gastrointestinal: No abdominal pain. No nausea, vomiting, diarrhea or constipation. No hematochezia. No melena.  Genitourinary: No dysuria, hematuria, or frequency.  Neurologic: No numbness or tingling. No weakness.  Skin: No rashes or pruritus.     OBJECTIVE:    Vital Signs Last 24 Hrs  T(C): 36.8 (18 Jan 2021 21:48), Max: 36.8 (18 Jan 2021 21:48)  T(F): 98.3 (18 Jan 2021 21:48), Max: 98.3 (18 Jan 2021 21:48)  HR: 61 (18 Jan 2021 21:48) (61 - 67)  BP: 134/68 (18 Jan 2021 21:48) (134/68 - 143/73)  BP(mean): --  RR: 18 (18 Jan 2021 21:48) (18 - 19)  SpO2: 96% (18 Jan 2021 21:48) (96% - 97%)I&O's Detail    18 Jan 2021 07:01  -  19 Jan 2021 07:00  --------------------------------------------------------  IN:  Total IN: 0 mL    OUT:    Bulb (mL): 50 mL    Indwelling Catheter - Urethral (mL): 1150 mL    Voided (mL): 750 mL  Total OUT: 1950 mL    Total NET: -1950 mL      MEDICATIONS  (STANDING):  acetaminophen   Tablet .. 650 milliGRAM(s) Oral every 6 hours  enoxaparin Injectable 40 milliGRAM(s) SubCutaneous daily  ertapenem  IVPB      ertapenem  IVPB 1000 milliGRAM(s) IV Intermittent every 24 hours  fluconAZOLE IVPB      fluconAZOLE IVPB 200 milliGRAM(s) IV Intermittent every 24 hours  simvastatin 20 milliGRAM(s) Oral at bedtime    MEDICATIONS  (PRN):  oxyCODONE    IR 5 milliGRAM(s) Oral every 4 hours PRN Moderate Pain (4 - 6)      PHYSICAL EXAM:  Constitutional: A&Ox3, NAD  Respiratory: Unlabored breathing  Abdomen: Soft, nondistended, NTTP. No rebound or guarding. Incisions C/D/I.   Extremities: WWP, MOYA spontaneously    LABS:                        12.7   9.22  )-----------( 370      ( 19 Jan 2021 07:58 )             41.5     01-19    141  |  104  |  8   ----------------------------<  113<H>  4.1   |  26  |  0.77    Ca    9.5      19 Jan 2021 07:58  Phos  3.1     01-19  Mg     2.1     01-19                IMAGING:

## 2021-01-19 NOTE — PROGRESS NOTE ADULT - PROBLEM SELECTOR PLAN 2
continue Ertapenem  s/p low anterior resection
continue antibiotics  s/p low anterior resection  cultures growing Citrobacter and Enterobacter sp  await sensitivity
continue antibiotics  s/p low anterior resection  cultures growing Citrobacter and Enterobacter sp  switched to Ertapenem
remains in NSR
no intervention required at this time  remains in NSR
no intervention required at this time  remains in NSR
remains in NSR  outpatient follow up with PCP

## 2021-01-19 NOTE — PROGRESS NOTE ADULT - SUBJECTIVE AND OBJECTIVE BOX
Patient is a 75y old  Female who presents with a chief complaint of abdominal surgery (19 Jan 2021 12:43)      DATE OF SERVICE: 01-19-21 @ 13:25    SUBJECTIVE / OVERNIGHT EVENTS: overnight events noted    ROS:  Resp: No cough no sputum production  CVS: No chest pain no palpitations no orthopnea  GI: no N/V/D  : no dysuria, no hematuria  Neuro: no weakness no paresthesias  Heme: No petechiae no easy bruising  Msk: No joint pain no swelling  Skin: No rash no itching        MEDICATIONS  (STANDING):  acetaminophen   Tablet .. 650 milliGRAM(s) Oral every 6 hours  enoxaparin Injectable 40 milliGRAM(s) SubCutaneous daily  ertapenem  IVPB      ertapenem  IVPB 1000 milliGRAM(s) IV Intermittent every 24 hours  fluconAZOLE IVPB 200 milliGRAM(s) IV Intermittent every 24 hours  fluconAZOLE IVPB      simvastatin 20 milliGRAM(s) Oral at bedtime    MEDICATIONS  (PRN):  oxyCODONE    IR 5 milliGRAM(s) Oral every 4 hours PRN Moderate Pain (4 - 6)        CAPILLARY BLOOD GLUCOSE        I&O's Summary    18 Jan 2021 07:01  -  19 Jan 2021 07:00  --------------------------------------------------------  IN: 0 mL / OUT: 1950 mL / NET: -1950 mL        Vital Signs Last 24 Hrs  T(C): 36.5 (19 Jan 2021 11:13), Max: 36.8 (18 Jan 2021 21:48)  T(F): 97.7 (19 Jan 2021 11:13), Max: 98.3 (18 Jan 2021 21:48)  HR: 74 (19 Jan 2021 11:13) (61 - 74)  BP: 143/74 (19 Jan 2021 11:13) (134/68 - 143/74)  BP(mean): --  RR: 18 (19 Jan 2021 11:13) (18 - 19)  SpO2: 98% (19 Jan 2021 11:13) (96% - 98%)    PHYSICAL EXAM:   HEENT: KYLE EOMI  Neck: Supple, no JVD  Lungs: no wheeze, no crackles  CVS: S1 S2 no M/R/G  Abdomen: no tenderness   Neuro: AO x 3 non focal  Skin: warm, dry      LABS:                        12.7   9.22  )-----------( 370      ( 19 Jan 2021 07:58 )             41.5     01-19    141  |  104  |  8   ----------------------------<  113<H>  4.1   |  26  |  0.77    Ca    9.5      19 Jan 2021 07:58  Phos  3.1     01-19  Mg     2.1     01-19                  All consultant(s) notes reviewed and care discussed with other providers        Contact Number, Dr Davis [phone]

## 2021-01-19 NOTE — PROGRESS NOTE ADULT - PROBLEM SELECTOR PROBLEM 4
Hyperlipidemia
History of diverticulosis
Hyperlipidemia
Hyperlipidemia
History of diverticulosis
History of diverticulosis
Hyperlipidemia

## 2021-01-19 NOTE — DIETITIAN INITIAL EVALUATION ADULT. - ORAL INTAKE PTA/DIET HISTORY
Pt. typically eats 3 meals/day which are home prepared.  Add sea salt to food.  Drinks sparkling water mostly, occasionally juice.

## 2021-01-19 NOTE — DIETITIAN INITIAL EVALUATION ADULT. - PERTINENT MEDS FT
MEDICATIONS  (STANDING):  acetaminophen   Tablet .. 650 milliGRAM(s) Oral every 6 hours  enoxaparin Injectable 40 milliGRAM(s) SubCutaneous daily  ertapenem  IVPB      ertapenem  IVPB 1000 milliGRAM(s) IV Intermittent every 24 hours  fluconAZOLE IVPB      fluconAZOLE IVPB 200 milliGRAM(s) IV Intermittent every 24 hours  simvastatin 20 milliGRAM(s) Oral at bedtime

## 2021-01-19 NOTE — PROGRESS NOTE ADULT - ASSESSMENT
76 yo female presents to [facility] for preop evaluation for laparoscopic low anterior resection possible stoma repair colovesical fistula now s/p uneventful surgery

## 2021-01-19 NOTE — PROGRESS NOTE ADULT - PROBLEM SELECTOR PROBLEM 3
Abscess of bladder
Abscess of bladder
Hyperlipidemia
Abscess of bladder
Hyperlipidemia
Hyperlipidemia
Abscess of bladder

## 2021-01-19 NOTE — PROGRESS NOTE ADULT - PROVIDER SPECIALTY LIST ADULT
Anesthesia
Surgery
Internal Medicine

## 2021-01-19 NOTE — DIETITIAN INITIAL EVALUATION ADULT. - NSPROEDAREADYLEARN_GEN_A_NUR
This is an extremely nice 73-year-old with marked leg swelling.  I would like him to take 5 mg of amlodipine daily and monitor his blood pressure.  I hope that we can eliminate this.  I would like him to call if there is any problem.    I also will request additional blood work and share that with his cardiologist at [facility].  
none

## 2021-01-19 NOTE — PROGRESS NOTE ADULT - ATTENDING COMMENTS
discussed with patient in detail, expresses understanding of treatment plans.
POD#8 LAR, repair colovesical fistula, doing well. CT cystogram normal.    - d/c home on PO Bactrim and Diflucan for 5d  - f/u in the office next wk
discussed with patient in detail, expresses understanding of treatment plans.
Patient seen and examined  Tolerating PO intake    Abd is soft, not tender and not distended  Drain is serous 70mL of recorded output  Ramirez in place    - CT cysto today  - if normal, will D/C ramirez  - if able void, will D/C drain, and discharge to home on 5 more days of PO diflucan
Pt seen/examined, agree with above.
Pt seen/examined earlier today. Doing well, no complaints.    - adv to [facility]  - cont IV antibx  - CT cystogram on Monday
Pt seen/examined, doing well. Positive bowel function, loose.     - adv diet as tolerated  - keep Timmons until CT cystogram on POD#7  - keep drain  - cont antibx  - ambulate
Pt seen/examined with PA, agree with above
discussed with patient in detail, expresses understanding of treatment plans.

## 2021-01-19 NOTE — PROGRESS NOTE ADULT - PROBLEM SELECTOR PLAN 4
defer to surgery  s/p surgery
no intervention required at this time
no intervention required at this time
defer to surgery  s/p surgery
no intervention required at this time
defer to surgery  s/p surgery
no intervention required at this time  outpatient fasting lipid panel with PCP

## 2021-01-19 NOTE — PROGRESS NOTE ADULT - PROBLEM SELECTOR PLAN 1
likely inflammation + nutritional  no intervention required at this time
outpatient follow up with cardiology  no intervention required at this time
outpatient follow up with cardiology  no intervention required at this time
mild  likely inflammation + nutritional  no intervention required at this time
appears to be in NSR  no anticoagulation  outpatient follow up with cardiology
stable hemoglobin  no intervention required at this time
stable hemoglobin  no intervention required at this time

## 2021-03-10 ENCOUNTER — INPATIENT (INPATIENT)
Facility: HOSPITAL | Age: 76
LOS: 3 days | Discharge: HOME HEALTH | End: 2021-03-14
Payer: MEDICARE

## 2021-03-10 VITALS
OXYGEN SATURATION: 100 % | HEIGHT: 67 IN | DIASTOLIC BLOOD PRESSURE: 91 MMHG | SYSTOLIC BLOOD PRESSURE: 160 MMHG | HEART RATE: 86 BPM | TEMPERATURE: 99 F | RESPIRATION RATE: 18 BRPM

## 2021-03-10 DIAGNOSIS — Z90.89 ACQUIRED ABSENCE OF OTHER ORGANS: Chronic | ICD-10-CM

## 2021-03-10 DIAGNOSIS — Z98.51 TUBAL LIGATION STATUS: Chronic | ICD-10-CM

## 2021-03-10 DIAGNOSIS — K56.609 UNSPECIFIED INTESTINAL OBSTRUCTION, UNSPECIFIED AS TO PARTIAL VERSUS COMPLETE OBSTRUCTION: ICD-10-CM

## 2021-03-10 PROBLEM — K57.90 DIVERTICULOSIS OF INTESTINE, PART UNSPECIFIED, WITHOUT PERFORATION OR ABSCESS WITHOUT BLEEDING: Chronic | Status: ACTIVE | Noted: 2020-12-30

## 2021-03-10 PROBLEM — I48.91 UNSPECIFIED ATRIAL FIBRILLATION: Chronic | Status: ACTIVE | Noted: 2020-12-07

## 2021-03-10 PROBLEM — Z87.891 PERSONAL HISTORY OF NICOTINE DEPENDENCE: Chronic | Status: ACTIVE | Noted: 2020-12-22

## 2021-03-10 LAB
ALBUMIN SERPL ELPH-MCNC: 4.7 G/DL — SIGNIFICANT CHANGE UP (ref 3.3–5)
ALP SERPL-CCNC: 75 U/L — SIGNIFICANT CHANGE UP (ref 40–120)
ALT FLD-CCNC: 10 U/L — SIGNIFICANT CHANGE UP (ref 4–33)
ANION GAP SERPL CALC-SCNC: 12 MMOL/L — SIGNIFICANT CHANGE UP (ref 7–14)
APPEARANCE UR: CLEAR — SIGNIFICANT CHANGE UP
AST SERPL-CCNC: 19 U/L — SIGNIFICANT CHANGE UP (ref 4–32)
BASOPHILS # BLD AUTO: 0.04 K/UL — SIGNIFICANT CHANGE UP (ref 0–0.2)
BASOPHILS NFR BLD AUTO: 0.3 % — SIGNIFICANT CHANGE UP (ref 0–2)
BILIRUB SERPL-MCNC: 1 MG/DL — SIGNIFICANT CHANGE UP (ref 0.2–1.2)
BILIRUB UR-MCNC: NEGATIVE — SIGNIFICANT CHANGE UP
BLD GP AB SCN SERPL QL: NEGATIVE — SIGNIFICANT CHANGE UP
BUN SERPL-MCNC: 13 MG/DL — SIGNIFICANT CHANGE UP (ref 7–23)
CALCIUM SERPL-MCNC: 10.1 MG/DL — SIGNIFICANT CHANGE UP (ref 8.4–10.5)
CHLORIDE SERPL-SCNC: 102 MMOL/L — SIGNIFICANT CHANGE UP (ref 98–107)
CO2 SERPL-SCNC: 25 MMOL/L — SIGNIFICANT CHANGE UP (ref 22–31)
COLOR SPEC: YELLOW — SIGNIFICANT CHANGE UP
CREAT SERPL-MCNC: 0.96 MG/DL — SIGNIFICANT CHANGE UP (ref 0.5–1.3)
DIFF PNL FLD: ABNORMAL
EOSINOPHIL # BLD AUTO: 0.17 K/UL — SIGNIFICANT CHANGE UP (ref 0–0.5)
EOSINOPHIL NFR BLD AUTO: 1.4 % — SIGNIFICANT CHANGE UP (ref 0–6)
GLUCOSE SERPL-MCNC: 115 MG/DL — HIGH (ref 70–99)
GLUCOSE UR QL: NEGATIVE — SIGNIFICANT CHANGE UP
HCT VFR BLD CALC: 44.5 % — SIGNIFICANT CHANGE UP (ref 34.5–45)
HGB BLD-MCNC: 14.2 G/DL — SIGNIFICANT CHANGE UP (ref 11.5–15.5)
IANC: 7.45 K/UL — SIGNIFICANT CHANGE UP (ref 1.5–8.5)
IMM GRANULOCYTES NFR BLD AUTO: 0.3 % — SIGNIFICANT CHANGE UP (ref 0–1.5)
INR BLD: 1.1 RATIO — SIGNIFICANT CHANGE UP (ref 0.88–1.16)
KETONES UR-MCNC: ABNORMAL
LEUKOCYTE ESTERASE UR-ACNC: ABNORMAL
LYMPHOCYTES # BLD AUTO: 26.4 % — SIGNIFICANT CHANGE UP (ref 13–44)
LYMPHOCYTES # BLD AUTO: 3.32 K/UL — HIGH (ref 1–3.3)
MCHC RBC-ENTMCNC: 27.8 PG — SIGNIFICANT CHANGE UP (ref 27–34)
MCHC RBC-ENTMCNC: 31.9 GM/DL — LOW (ref 32–36)
MCV RBC AUTO: 87.3 FL — SIGNIFICANT CHANGE UP (ref 80–100)
MONOCYTES # BLD AUTO: 1.54 K/UL — HIGH (ref 0–0.9)
MONOCYTES NFR BLD AUTO: 12.3 % — SIGNIFICANT CHANGE UP (ref 2–14)
NEUTROPHILS # BLD AUTO: 7.45 K/UL — HIGH (ref 1.8–7.4)
NEUTROPHILS NFR BLD AUTO: 59.3 % — SIGNIFICANT CHANGE UP (ref 43–77)
NITRITE UR-MCNC: POSITIVE
NRBC # BLD: 0 /100 WBCS — SIGNIFICANT CHANGE UP
NRBC # FLD: 0 K/UL — SIGNIFICANT CHANGE UP
PH UR: 6 — SIGNIFICANT CHANGE UP (ref 5–8)
PLATELET # BLD AUTO: 287 K/UL — SIGNIFICANT CHANGE UP (ref 150–400)
POTASSIUM SERPL-MCNC: 3.8 MMOL/L — SIGNIFICANT CHANGE UP (ref 3.5–5.3)
POTASSIUM SERPL-SCNC: 3.8 MMOL/L — SIGNIFICANT CHANGE UP (ref 3.5–5.3)
PROT SERPL-MCNC: 7.5 G/DL — SIGNIFICANT CHANGE UP (ref 6–8.3)
PROT UR-MCNC: ABNORMAL
PROTHROM AB SERPL-ACNC: 12.5 SEC — SIGNIFICANT CHANGE UP (ref 10.6–13.6)
RBC # BLD: 5.1 M/UL — SIGNIFICANT CHANGE UP (ref 3.8–5.2)
RBC # FLD: 14.6 % — HIGH (ref 10.3–14.5)
RH IG SCN BLD-IMP: POSITIVE — SIGNIFICANT CHANGE UP
SARS-COV-2 RNA SPEC QL NAA+PROBE: SIGNIFICANT CHANGE UP
SODIUM SERPL-SCNC: 139 MMOL/L — SIGNIFICANT CHANGE UP (ref 135–145)
SP GR SPEC: >1.05 (ref 1.01–1.02)
UROBILINOGEN FLD QL: ABNORMAL
WBC # BLD: 12.56 K/UL — HIGH (ref 3.8–10.5)
WBC # FLD AUTO: 12.56 K/UL — HIGH (ref 3.8–10.5)

## 2021-03-10 PROCEDURE — 74177 CT ABD & PELVIS W/CONTRAST: CPT | Mod: 26

## 2021-03-10 PROCEDURE — 71045 X-RAY EXAM CHEST 1 VIEW: CPT | Mod: 26

## 2021-03-10 PROCEDURE — 99284 EMERGENCY DEPT VISIT MOD MDM: CPT | Mod: CS

## 2021-03-10 RX ORDER — CIPROFLOXACIN LACTATE 400MG/40ML
400 VIAL (ML) INTRAVENOUS ONCE
Refills: 0 | Status: COMPLETED | OUTPATIENT
Start: 2021-03-10 | End: 2021-03-10

## 2021-03-10 RX ORDER — CIPROFLOXACIN LACTATE 400MG/40ML
400 VIAL (ML) INTRAVENOUS EVERY 12 HOURS
Refills: 0 | Status: DISCONTINUED | OUTPATIENT
Start: 2021-03-11 | End: 2021-03-13

## 2021-03-10 RX ORDER — ACETAMINOPHEN 500 MG
1000 TABLET ORAL ONCE
Refills: 0 | Status: COMPLETED | OUTPATIENT
Start: 2021-03-10 | End: 2021-03-10

## 2021-03-10 RX ORDER — ENOXAPARIN SODIUM 100 MG/ML
40 INJECTION SUBCUTANEOUS DAILY
Refills: 0 | Status: DISCONTINUED | OUTPATIENT
Start: 2021-03-10 | End: 2021-03-14

## 2021-03-10 RX ORDER — ONDANSETRON 8 MG/1
4 TABLET, FILM COATED ORAL ONCE
Refills: 0 | Status: COMPLETED | OUTPATIENT
Start: 2021-03-10 | End: 2021-03-10

## 2021-03-10 RX ORDER — BENZOCAINE AND MENTHOL 5; 1 G/100ML; G/100ML
1 LIQUID ORAL
Refills: 0 | Status: DISCONTINUED | OUTPATIENT
Start: 2021-03-10 | End: 2021-03-14

## 2021-03-10 RX ORDER — CIPROFLOXACIN LACTATE 400MG/40ML
VIAL (ML) INTRAVENOUS
Refills: 0 | Status: DISCONTINUED | OUTPATIENT
Start: 2021-03-10 | End: 2021-03-13

## 2021-03-10 RX ORDER — SODIUM CHLORIDE 9 MG/ML
1000 INJECTION INTRAMUSCULAR; INTRAVENOUS; SUBCUTANEOUS ONCE
Refills: 0 | Status: COMPLETED | OUTPATIENT
Start: 2021-03-10 | End: 2021-03-10

## 2021-03-10 RX ORDER — MORPHINE SULFATE 50 MG/1
4 CAPSULE, EXTENDED RELEASE ORAL ONCE
Refills: 0 | Status: DISCONTINUED | OUTPATIENT
Start: 2021-03-10 | End: 2021-03-10

## 2021-03-10 RX ORDER — SODIUM CHLORIDE 9 MG/ML
1000 INJECTION, SOLUTION INTRAVENOUS
Refills: 0 | Status: DISCONTINUED | OUTPATIENT
Start: 2021-03-10 | End: 2021-03-12

## 2021-03-10 RX ADMIN — ENOXAPARIN SODIUM 40 MILLIGRAM(S): 100 INJECTION SUBCUTANEOUS at 19:04

## 2021-03-10 RX ADMIN — SODIUM CHLORIDE 100 MILLILITER(S): 9 INJECTION, SOLUTION INTRAVENOUS at 16:29

## 2021-03-10 RX ADMIN — Medication 400 MILLIGRAM(S): at 16:29

## 2021-03-10 RX ADMIN — MORPHINE SULFATE 4 MILLIGRAM(S): 50 CAPSULE, EXTENDED RELEASE ORAL at 14:10

## 2021-03-10 RX ADMIN — Medication 200 MILLIGRAM(S): at 19:04

## 2021-03-10 RX ADMIN — ONDANSETRON 4 MILLIGRAM(S): 8 TABLET, FILM COATED ORAL at 11:06

## 2021-03-10 RX ADMIN — SODIUM CHLORIDE 1000 MILLILITER(S): 9 INJECTION INTRAMUSCULAR; INTRAVENOUS; SUBCUTANEOUS at 11:07

## 2021-03-10 NOTE — H&P ADULT - NSHPLABSRESULTS_GEN_ALL_CORE
----------  LABORATORY DATA:  ----------                        14.2   12.56 )-----------( 287      ( 10 Mar 2021 10:59 )             44.5               03-10    139  |  102  |  13  ----------------------------<  115<H>  3.8   |  25  |  0.96    Ca    10.1      10 Mar 2021 10:59    TPro  7.5  /  Alb  4.7  /  TBili  1.0  /  DBili  x   /  AST  19  /  ALT  10  /  AlkPhos  75  03-10    LIVER FUNCTIONS - ( 10 Mar 2021 10:59 )  Alb: 4.7 g/dL / Pro: 7.5 g/dL / ALK PHOS: 75 U/L / ALT: 10 U/L / AST: 19 U/L / GGT: x           PT/INR - ( 10 Mar 2021 10:59 )   PT: 12.5 sec;   INR: 1.10 ratio      Urinalysis Basic - ( 10 Mar 2021 13:40 )    Color: Yellow / Appearance: Clear / SG: >1.050 / pH: x  Gluc: x / Ketone: Small  / Bili: Negative / Urobili: 3 mg/dL   Blood: x / Protein: 30 mg/dL / Nitrite: Positive   Leuk Esterase: Small / RBC: 5-10 /HPF / WBC 10-20 /HPF   Sq Epi: x / Non Sq Epi: x / Bacteria: x    ----------  RADIOGRAPHIC DATA:  ---------    < from: CT Abdomen and Pelvis w/ Oral Cont and w/ IV Cont (03.10.21 @ 13:00) >    FINDINGS:    LOWER CHEST: Elevated left diaphragm.    LIVER: Cysts and other lesions too small to characterize.  BILE DUCTS: Normal caliber.  GALLBLADDER: Within normal limits.  SPLEEN: Within normal limits.  PANCREAS: Within normal limits.  ADRENALS: Within normal limits.  KIDNEYS/URETERS: Subcentimeter lesions too small to characterize.    BLADDER: Suggestion of anterior bladder mass.  REPRODUCTIVE ORGANS: Within normal limits.    BOWEL: There is a small bowel obstruction with transition point in the mid abdomen on series 2 image 64. Rectosigmoid anastamosis. Normal appendix.  PERITONEUM: Mild ascites.  VESSELS:  Within normal limits.  RETROPERITONEUM/LYMPH NODES: No lymphadenopathy.  ABDOMINAL WALL: Postsurgical changes.  BONES: Within normal limits.    IMPRESSION: Small bowel obstruction with transition point in the mid abdomen.    Anterior bladder mass; urology consultation recommended.    < end of copied text >

## 2021-03-10 NOTE — ED PROVIDER NOTE - CLINICAL SUMMARY MEDICAL DECISION MAKING FREE TEXT BOX
Pt is a 76 y/o F PMHx HLD, diverticulitis, LAR p/w abdominal pain x 1 week. -- r/o bowel obstruction, possible UTI -- labs, ua, ucx, lipase, CT abd and pelvis

## 2021-03-10 NOTE — ED PROVIDER NOTE - OBJECTIVE STATEMENT
Pt is a 76 y/o F PMHx HLD, diverticulitis, LAR p/w abdominal pain x 1 week.  Pt reports gradual onset, intermittent lower and periumbilical abdominal pain sharp which occurs after PO intake.  Pt reports for past few days, pain becoming more intense and associated with nausea and nonbloody nonbilious vomiting.  She reports vomiting improves pain.  Pt denies any fevers, chills, chest pain, SOB, dysuria, cloudy urine, brbpr, melena.

## 2021-03-10 NOTE — H&P ADULT - ASSESSMENT
75F s/p lar () now p/w 2d of abd pain nausea / vomiting and found to have sbo, likely adhesive in nature. Will plan for surgical admission and conservative management at present time.     - admit to surgery, a-team, surgery, GORGE Renner MD, floor bed  - diet: NPO  - IVF: LR @ 100cc/h  - pain control  - vte ppx  - encourage ambulation / cough / deep breathing / incentive spirometry   - disposition: floor admission at present time  - ngt to lcws - positioned confirmed on CXR  - serial abd exams  - no pain medication w/o abd exam by surgical team.     Plan discussed with attending surgeon ODELL Renner MD.     Please contact Surgery A-Team (P: [phone]) with any questions.    SRUTHI Alamo MD, PGY-III  Surgery, A-Team  Pager: [phone]  [facility]

## 2021-03-10 NOTE — ED ADULT NURSE NOTE - OBJECTIVE STATEMENT
pt co abdominal discomfort x 4 days with n/v . pt states small bm last night. pt offered pain medication but stated did not need any at this time. Pt awaiting abdominal ct. medicated for nausea. no vomiting noted.

## 2021-03-10 NOTE — H&P ADULT - NSHPPHYSICALEXAM_GEN_ALL_CORE
Vital Signs Last 24 Hrs  T(C): 37.1 (10 Mar 2021 09:57), Max: 37.1 (10 Mar 2021 09:57)  T(F): 98.7 (10 Mar 2021 09:57), Max: 98.7 (10 Mar 2021 09:57)  HR: 86 (10 Mar 2021 09:57) (86 - 86)  BP: 160/91 (10 Mar 2021 09:57) (160/91 - 160/91)  RR: 18 (10 Mar 2021 09:57) (18 - 18)  SpO2: 100% (10 Mar 2021 09:57) (100% - 100%)    PHYSICAL EXAM:  Gen: WD, WN, in no acute distress.  HEENT: PERRLA, EOMI. oropharynx clear.  Lungs: Respirations unlabored.   Abd: Soft, mildly tender in epigastrium, distended.   Ext: No clubbing, no cyanosis, no edema.  Neuro: A/Ox3. Cranial nerve intact. No focal deficit.

## 2021-03-10 NOTE — ED ADULT TRIAGE NOTE - CHIEF COMPLAINT QUOTE
Pt complaining of abdominal pain and N/V for the past few days. Pt states she had surgery on her colon in January . Pt denies chest pain, sob, fever or chills.

## 2021-03-10 NOTE — H&P ADULT - HISTORY OF PRESENT ILLNESS
75M s/p lar (1.11.21) now p/w 2d of abd pain nausea / vomiting and no flatus. Patient states she had onset of crampy abd pain for 2d prior to presentation. Evening prior to presentatin pain was very severe and prevented patient from sleeping. Has had last BM 1d prior to presentation, unsure last flatus.     Denies fevers / chills / ha / cp / sob / melena / hematemesis.

## 2021-03-10 NOTE — ED PROVIDER NOTE - ATTENDING CONTRIBUTION TO CARE
Dr. Middleton:  I performed a face to face bedside interview with patient regarding history of present illness, review of symptoms and past medical history. I completed an independent physical exam.  I have discussed patient's plan of care with PA.   I agree with note as stated above, having amended the EMR as needed to reflect my findings.   This includes HISTORY OF PRESENT ILLNESS, HIV, PAST MEDICAL/SURGICAL/FAMILY/SOCIAL HISTORY, ALLERGIES AND HOME MEDICATIONS, REVIEW OF SYSTEMS, PHYSICAL EXAM, and any PROGRESS NOTES during the time I functioned as the attending physician for this patient.    75F h/o HLD, perforated diverticulitis s/p resection presents with waxing and waning abdominal pain over past week that sharply increased last night and with some N/V over past couple days.  Last bowel movement last night.      Exam:  - nad  - rrr  - ctab  - abd soft, mildly tender periumbilical area    A/P  - eval SBO vs other pathology  - cbc, cmp, lipase, ua, CT abd/pelvis  - surgery team at bedside

## 2021-03-11 LAB
ANION GAP SERPL CALC-SCNC: 13 MMOL/L — SIGNIFICANT CHANGE UP (ref 7–14)
BUN SERPL-MCNC: 11 MG/DL — SIGNIFICANT CHANGE UP (ref 7–23)
CALCIUM SERPL-MCNC: 8.9 MG/DL — SIGNIFICANT CHANGE UP (ref 8.4–10.5)
CHLORIDE SERPL-SCNC: 103 MMOL/L — SIGNIFICANT CHANGE UP (ref 98–107)
CO2 SERPL-SCNC: 22 MMOL/L — SIGNIFICANT CHANGE UP (ref 22–31)
CREAT SERPL-MCNC: 0.71 MG/DL — SIGNIFICANT CHANGE UP (ref 0.5–1.3)
GLUCOSE SERPL-MCNC: 106 MG/DL — HIGH (ref 70–99)
HCT VFR BLD CALC: 40.3 % — SIGNIFICANT CHANGE UP (ref 34.5–45)
HGB BLD-MCNC: 12.2 G/DL — SIGNIFICANT CHANGE UP (ref 11.5–15.5)
MAGNESIUM SERPL-MCNC: 1.9 MG/DL — SIGNIFICANT CHANGE UP (ref 1.6–2.6)
MCHC RBC-ENTMCNC: 27.2 PG — SIGNIFICANT CHANGE UP (ref 27–34)
MCHC RBC-ENTMCNC: 30.3 GM/DL — LOW (ref 32–36)
MCV RBC AUTO: 89.8 FL — SIGNIFICANT CHANGE UP (ref 80–100)
NRBC # BLD: 0 /100 WBCS — SIGNIFICANT CHANGE UP
NRBC # FLD: 0 K/UL — SIGNIFICANT CHANGE UP
PHOSPHATE SERPL-MCNC: 3.2 MG/DL — SIGNIFICANT CHANGE UP (ref 2.5–4.5)
PLATELET # BLD AUTO: 225 K/UL — SIGNIFICANT CHANGE UP (ref 150–400)
POTASSIUM SERPL-MCNC: 3.6 MMOL/L — SIGNIFICANT CHANGE UP (ref 3.5–5.3)
POTASSIUM SERPL-SCNC: 3.6 MMOL/L — SIGNIFICANT CHANGE UP (ref 3.5–5.3)
RBC # BLD: 4.49 M/UL — SIGNIFICANT CHANGE UP (ref 3.8–5.2)
RBC # FLD: 14.6 % — HIGH (ref 10.3–14.5)
SODIUM SERPL-SCNC: 138 MMOL/L — SIGNIFICANT CHANGE UP (ref 135–145)
WBC # BLD: 8.67 K/UL — SIGNIFICANT CHANGE UP (ref 3.8–10.5)
WBC # FLD AUTO: 8.67 K/UL — SIGNIFICANT CHANGE UP (ref 3.8–10.5)

## 2021-03-11 PROCEDURE — 74018 RADEX ABDOMEN 1 VIEW: CPT | Mod: 26

## 2021-03-11 PROCEDURE — 71045 X-RAY EXAM CHEST 1 VIEW: CPT | Mod: 26

## 2021-03-11 RX ORDER — INFLUENZA VIRUS VACCINE 15; 15; 15; 15 UG/.5ML; UG/.5ML; UG/.5ML; UG/.5ML
0.5 SUSPENSION INTRAMUSCULAR ONCE
Refills: 0 | Status: DISCONTINUED | OUTPATIENT
Start: 2021-03-11 | End: 2021-03-11

## 2021-03-11 RX ORDER — INFLUENZA VIRUS VACCINE 15; 15; 15; 15 UG/.5ML; UG/.5ML; UG/.5ML; UG/.5ML
0.7 SUSPENSION INTRAMUSCULAR ONCE
Refills: 0 | Status: DISCONTINUED | OUTPATIENT
Start: 2021-03-11 | End: 2021-03-14

## 2021-03-11 RX ORDER — POTASSIUM CHLORIDE 20 MEQ
10 PACKET (EA) ORAL
Refills: 0 | Status: COMPLETED | OUTPATIENT
Start: 2021-03-11 | End: 2021-03-11

## 2021-03-11 RX ORDER — DIATRIZOATE MEGLUMINE 180 MG/ML
120 INJECTION, SOLUTION INTRAVESICAL ONCE
Refills: 0 | Status: COMPLETED | OUTPATIENT
Start: 2021-03-11 | End: 2021-03-11

## 2021-03-11 RX ORDER — ACETAMINOPHEN 500 MG
1000 TABLET ORAL ONCE
Refills: 0 | Status: COMPLETED | OUTPATIENT
Start: 2021-03-11 | End: 2021-03-11

## 2021-03-11 RX ADMIN — SODIUM CHLORIDE 100 MILLILITER(S): 9 INJECTION, SOLUTION INTRAVENOUS at 05:48

## 2021-03-11 RX ADMIN — Medication 200 MILLIGRAM(S): at 06:42

## 2021-03-11 RX ADMIN — Medication 400 MILLIGRAM(S): at 11:45

## 2021-03-11 RX ADMIN — DIATRIZOATE MEGLUMINE 120 MILLILITER(S): 180 INJECTION, SOLUTION INTRAVESICAL at 10:32

## 2021-03-11 RX ADMIN — BENZOCAINE AND MENTHOL 1 LOZENGE: 5; 1 LIQUID ORAL at 09:07

## 2021-03-11 RX ADMIN — Medication 200 MILLIGRAM(S): at 19:14

## 2021-03-11 RX ADMIN — Medication 100 MILLIEQUIVALENT(S): at 14:19

## 2021-03-11 RX ADMIN — ENOXAPARIN SODIUM 40 MILLIGRAM(S): 100 INJECTION SUBCUTANEOUS at 11:46

## 2021-03-11 RX ADMIN — Medication 1000 MILLIGRAM(S): at 12:26

## 2021-03-11 RX ADMIN — BENZOCAINE AND MENTHOL 1 LOZENGE: 5; 1 LIQUID ORAL at 14:19

## 2021-03-11 RX ADMIN — BENZOCAINE AND MENTHOL 1 LOZENGE: 5; 1 LIQUID ORAL at 19:14

## 2021-03-11 RX ADMIN — Medication 100 MILLIEQUIVALENT(S): at 10:55

## 2021-03-11 RX ADMIN — Medication 100 MILLIEQUIVALENT(S): at 12:26

## 2021-03-11 NOTE — PROGRESS NOTE ADULT - SUBJECTIVE AND OBJECTIVE BOX
A Team Surgery Daily Progress Note  =====================================================    SUBJECTIVE: Patient seen and examined at bedside on AM rounds. Patient reports that they're feeling well. NPO, denies nausea, vomiting.   - Flatus/   - BM. OOB/Ambulating as tolerated. Denies fever, chills.    MEDICATIONS  (STANDING):  ciprofloxacin   IVPB      ciprofloxacin   IVPB 400 milliGRAM(s) IV Intermittent every 12 hours  enoxaparin Injectable 40 milliGRAM(s) SubCutaneous daily  influenza  Vaccine (HIGH DOSE) 0.7 milliLiter(s) IntraMuscular once  lactated ringers. 1000 milliLiter(s) (100 mL/Hr) IV Continuous <Continuous>    MEDICATIONS  (PRN):  benzocaine 15 mG/menthol 3.6 mG (Sugar-Free) Lozenge 1 Lozenge Oral every 2 hours PRN Sore Throat      OBJECTIVE:    Vital Signs Last 24 Hrs  T(C): 37.1 (10 Mar 2021 23:50), Max: 37.1 (10 Mar 2021 09:57)  T(F): 98.7 (10 Mar 2021 23:50), Max: 98.7 (10 Mar 2021 09:57)  HR: 68 (10 Mar 2021 23:50) (54 - 86)  BP: 149/64 (10 Mar 2021 23:50) (146/68 - 160/91)  BP(mean): --  RR: 18 (10 Mar 2021 23:50) (18 - 18)  SpO2: 99% (10 Mar 2021 23:50) (97% - 100%)    PHYSICAL EXAM:  Gen: WD, WN, in no acute distress.  HEENT: PERRLA, EOMI. oropharynx clear. NGT in place  Lungs: Respirations unlabored.   Abd: Soft, mildly tender in epigastrium, distended.   Ext: No clubbing, no cyanosis, no edema.  Neuro: A/Ox3. Cranial nerve intact.     I&O's Detail    10 Mar 2021 07:01  -  11 Mar 2021 01:53  --------------------------------------------------------  IN:  Total IN: 0 mL    OUT:    Nasogastric/Oral tube (mL): 200 mL  Total OUT: 200 mL    Total NET: -200 mL          Daily Height in cm: 170.18 (10 Mar 2021 09:57)    Daily     LABS:                        14.2   12.56 )-----------( 287      ( 10 Mar 2021 10:59 )             44.5     03-10    139  |  102  |  13  ----------------------------<  115<H>  3.8   |  25  |  0.96    Ca    10.1      10 Mar 2021 10:59    TPro  7.5  /  Alb  4.7  /  TBili  1.0  /  DBili  x   /  AST  19  /  ALT  10  /  AlkPhos  75  03-10    PT/INR - ( 10 Mar 2021 10:59 )   PT: 12.5 sec;   INR: 1.10 ratio           Urinalysis Basic - ( 10 Mar 2021 13:40 )    Color: Yellow / Appearance: Clear / SG: >1.050 / pH: x  Gluc: x / Ketone: Small  / Bili: Negative / Urobili: 3 mg/dL   Blood: x / Protein: 30 mg/dL / Nitrite: Positive   Leuk Esterase: Small / RBC: 5-10 /HPF / WBC 10-20 /HPF   Sq Epi: x / Non Sq Epi: x / Bacteria: x                         A Team Surgery Daily Progress Note  =====================================================    SUBJECTIVE: Patient seen and examined at bedside on AM rounds. Patient reports that they're feeling well. NPO, denies nausea, vomiting.   - Flatus/   - BM. OOB/Ambulating as tolerated. Denies fever, chills.    MEDICATIONS  (STANDING):  ciprofloxacin   IVPB      ciprofloxacin   IVPB 400 milliGRAM(s) IV Intermittent every 12 hours  enoxaparin Injectable 40 milliGRAM(s) SubCutaneous daily  influenza  Vaccine (HIGH DOSE) 0.7 milliLiter(s) IntraMuscular once  lactated ringers. 1000 milliLiter(s) (100 mL/Hr) IV Continuous <Continuous>    MEDICATIONS  (PRN):  benzocaine 15 mG/menthol 3.6 mG (Sugar-Free) Lozenge 1 Lozenge Oral every 2 hours PRN Sore Throat    OBJECTIVE:    Vital Signs Last 24 Hrs  T(C): 37.1 (10 Mar 2021 23:50), Max: 37.1 (10 Mar 2021 09:57)  T(F): 98.7 (10 Mar 2021 23:50), Max: 98.7 (10 Mar 2021 09:57)  HR: 68 (10 Mar 2021 23:50) (54 - 86)  BP: 149/64 (10 Mar 2021 23:50) (146/68 - 160/91)  RR: 18 (10 Mar 2021 23:50) (18 - 18)  SpO2: 99% (10 Mar 2021 23:50) (97% - 100%)    PHYSICAL EXAM:  Gen: WD, WN, in no acute distress.  HEENT: PERRLA, EOMI. oropharynx clear. NGT in place  Lungs: Respirations unlabored.   Abd: Soft, non-tender, distended.     I&O's Detail    10 Mar 2021 07:01  -  11 Mar 2021 01:53  --------------------------------------------------------  IN:  Total IN: 0 mL    OUT:    Nasogastric/Oral tube (mL): 200 mL  Total OUT: 200 mL    Total NET: -200 mL    Daily Height in cm: 170.18 (10 Mar 2021 09:57)    Daily     LABS:                        14.2   12.56 )-----------( 287      ( 10 Mar 2021 10:59 )             44.5     03-10    139  |  102  |  13  ----------------------------<  115<H>  3.8   |  25  |  0.96    Ca    10.1      10 Mar 2021 10:59    TPro  7.5  /  Alb  4.7  /  TBili  1.0  /  DBili  x   /  AST  19  /  ALT  10  /  AlkPhos  75  03-10    PT/INR - ( 10 Mar 2021 10:59 )   PT: 12.5 sec;   INR: 1.10 ratio      Urinalysis Basic - ( 10 Mar 2021 13:40 )    Color: Yellow / Appearance: Clear / SG: >1.050 / pH: x  Gluc: x / Ketone: Small  / Bili: Negative / Urobili: 3 mg/dL   Blood: x / Protein: 30 mg/dL / Nitrite: Positive   Leuk Esterase: Small / RBC: 5-10 /HPF / WBC 10-20 /HPF   Sq Epi: x / Non Sq Epi: x / Bacteria: x

## 2021-03-11 NOTE — CHART NOTE - NSCHARTNOTEFT_GEN_A_CORE
CAPRINI SCORE    AGE RELATED RISK FACTORS                                                             [ ] Age 41-60 years                                            (1 Point)  [ ] Age: 61-74 years                                           (2 Points)                 [x] Age= 75 years                                                (3 Points)             DISEASE RELATED RISK FACTORS                                                       [ ] Edema in the lower extremities                 (1 Point)                     [ ] Varicose veins                                               (1 Point)                                 [x] BMI > 25 Kg/m2                                            (1 Point)                                  [ ] Serious infection (ie PNA)                            (1 Point)                     [ ] Lung disease ( COPD, Emphysema)            (1 Point)                                                                          [ ] Acute myocardial infarction                         (1 Point)                  [ ] Congestive heart failure (in the previous month)  (1 Point)         [ ] Inflammatory bowel disease                            (1 Point)                  [ ] Central venous access, PICC or Port               (2 points)       (within the last month)                                                                [ ] Stroke (in the previous month)                        (5 Points)    [ ] Previous or present malignancy                       (2 points)                                                                                                                                                         HEMATOLOGY RELATED FACTORS                                                         [ ] Prior episodes of VTE                                     (3 Points)                     [ ] Positive family history for VTE                      (3 Points)                  [ ] Prothrombin 56906 A                                     (3 Points)                     [ ] Factor V Leiden                                                (3 Points)                        [ ] Lupus anticoagulants                                      (3 Points)                                                           [ ] Anticardiolipin antibodies                              (3 Points)                                                       [ ] High homocysteine in the blood                   (3 Points)                                             [ ] Other congenital or acquired thrombophilia      (3 Points)                                                [ ] Heparin induced thrombocytopenia                  (3 Points)                                        MOBILITY RELATED FACTORS  [ ] Bed rest                                                         (1 Point)  [ ] Plaster cast                                                    (2 points)  [ ] Bed bound for more than 72 hours           (2 Points)    GENDER SPECIFIC FACTORS  [ ] Pregnancy or had a baby within the last month   (1 Point)  [ ] Post-partum < 6 weeks                                   (1 Point)  [ ] Hormonal therapy  or oral contraception   (1 Point)  [ ] History of pregnancy complications              (1 point)  [ ] Unexplained or recurrent              (1 Point)    OTHER RISK FACTORS                                           (1 Point)  BMI >40, smoking, diabetes requiring insulin, chemotherapy  blood transfusions and length of surgery over 2 hours    SURGERY RELATED RISK FACTORS  [ ]  Section within the last month     (1 Point)  [ ] Minor surgery                                                  (1 Point)  [ ] Arthroscopic surgery                                       (2 Points)  [ ] Planned major surgery lasting more            (2 Points)      than 45 minutes     [ ] Elective hip or knee joint replacement       (5 points)       surgery                                                TRAUMA RELATED RISK FACTORS  [ ] Fracture of the hip, pelvis, or leg                       (5 Points)  [ ] Spinal cord injury resulting in paralysis             (5 points)       (in the previous month)    [ ] Paralysis  (less than 1 month)                             (5 Points)  [ ] Multiple Trauma within 1 month                        (5 Points)    Total Score [   4     ]    Caprini Score 0-2: Low Risk, NO VTE prophylaxis required for most patients, encourage ambulation  Caprini Score 3-6: Moderate Risk , pharmacologic VTE prophylaxis is indicated for most patients (in the absence of contraindications)  Caprini Score Greater than or =7: High risk, pharmacologic VTE prophylaxis indicated for most patients (in the absence of contraindications)

## 2021-03-11 NOTE — PROGRESS NOTE ADULT - ASSESSMENT
Assessment and Plan:  75F s/p lar () now p/w 2d of abd pain nausea / vomiting and found to have sbo, likely adhesive in nature. Will plan for surgical admission and non-operative management at present time.     - diet: NPO  - IVF: LR @ 100cc/h  - pain control  -IV cipro  - vte ppx  - encourage ambulation / cough / deep breathing / incentive spirometry   - ngt to lcws  - serial abd exams  - no pain medication w/o abd exam by surgical team.     A team surgery [id number] Assessment and Plan:  75F s/p lar () now p/w 2d of abd pain nausea / vomiting and found to have sbo, likely adhesive in nature. Patient currently on floor with ngt in place awaiting return of gi function.     - diet: NPO  - IVF: LR @ 100cc/h  - pain control  - IV cipro for uti, f/u cx  - vte ppx  - encourage ambulation / cough / deep breathing / incentive spirometry   - ngt to lcws  - serial abd exams  - no pain medication w/o abd exam by surgical team.   - will repeat cxr this am to confirm ngt position after transfer in floors    A team surgery [id number]

## 2021-03-11 NOTE — CHART NOTE - NSCHARTNOTEFT_GEN_A_CORE
CAPRINI SCORE    AGE RELATED RISK FACTORS                                                             [ ] Age 41-60 years                                            (1 Point)  [ ] Age: 61-74 years                                           (2 Points)                 [ X ] Age= 75 years                                                (3 Points)             DISEASE RELATED RISK FACTORS                                                       [ ] Edema in the lower extremities                 (1 Point)                     [ ] Varicose veins                                               (1 Point)                                 [ ] BMI > 25 Kg/m2                                            (1 Point)                                  [ ] Serious infection (ie PNA)                            (1 Point)                     [ ] Lung disease ( COPD, Emphysema)            (1 Point)                                                                          [ ] Acute myocardial infarction                         (1 Point)                  [ ] Congestive heart failure (in the previous month)  (1 Point)         [ ] Inflammatory bowel disease                            (1 Point)                  [ ] Central venous access, PICC or Port               (2 points)       (within the last month)                                                                [ ] Stroke (in the previous month)                        (5 Points)    [ ] Previous or present malignancy                       (2 points)                                                                                                                                                         HEMATOLOGY RELATED FACTORS                                                         [ ] Prior episodes of VTE                                     (3 Points)                     [ ] Positive family history for VTE                      (3 Points)                  [ ] Prothrombin 58904 A                                     (3 Points)                     [ ] Factor V Leiden                                                (3 Points)                        [ ] Lupus anticoagulants                                      (3 Points)                                                           [ ] Anticardiolipin antibodies                              (3 Points)                                                       [ ] High homocysteine in the blood                   (3 Points)                                             [ ] Other congenital or acquired thrombophilia      (3 Points)                                                [ ] Heparin induced thrombocytopenia                  (3 Points)                                        MOBILITY RELATED FACTORS  [ ] Bed rest                                                         (1 Point)  [ ] Plaster cast                                                    (2 points)  [ ] Bed bound for more than 72 hours           (2 Points)    GENDER SPECIFIC FACTORS  [ ] Pregnancy or had a baby within the last month   (1 Point)  [ ] Post-partum < 6 weeks                                   (1 Point)  [ ] Hormonal therapy  or oral contraception   (1 Point)  [ ] History of pregnancy complications              (1 point)  [ ] Unexplained or recurrent              (1 Point)    OTHER RISK FACTORS                                           (1 Point)  [ ] BMI >40, smoking, diabetes requiring insulin, chemotherapy  blood transfusions and length of surgery over 2 hours    SURGERY RELATED RISK FACTORS  [ ]  Section within the last month     (1 Point)  [ ] Minor surgery                                                  (1 Point)  [ ] Arthroscopic surgery                                       (2 Points)  [ ] Planned major surgery lasting more            (2 Points)      than 45 minutes     [ ] Elective hip or knee joint replacement       (5 points)       surgery                                                TRAUMA RELATED RISK FACTORS  [ ] Fracture of the hip, pelvis, or leg                       (5 Points)  [ ] Spinal cord injury resulting in paralysis             (5 points)       (in the previous month)    [ ] Paralysis  (less than 1 month)                             (5 Points)  [ ] Multiple Trauma within 1 month                        (5 Points)    Total Score [     3   ]    Caprini Score 0-2: Low Risk, NO VTE prophylaxis required for most patients, encourage ambulation  Caprini Score 3-6: Moderate Risk , pharmacologic VTE prophylaxis is indicated for most patients (in the absence of contraindications)  Caprini Score Greater than or =7: High risk, pharmocologic VTE prophylaxis indicated for most patients (in the absence of contraindications)

## 2021-03-12 ENCOUNTER — TRANSCRIPTION ENCOUNTER (OUTPATIENT)
Age: 76
End: 2021-03-12

## 2021-03-12 LAB
-  AMIKACIN: SIGNIFICANT CHANGE UP
-  AMOXICILLIN/CLAVULANIC ACID: SIGNIFICANT CHANGE UP
-  AMPICILLIN/SULBACTAM: SIGNIFICANT CHANGE UP
-  AMPICILLIN: SIGNIFICANT CHANGE UP
-  AZTREONAM: SIGNIFICANT CHANGE UP
-  CEFAZOLIN: SIGNIFICANT CHANGE UP
-  CEFEPIME: SIGNIFICANT CHANGE UP
-  CEFOXITIN: SIGNIFICANT CHANGE UP
-  CEFTRIAXONE: SIGNIFICANT CHANGE UP
-  CIPROFLOXACIN: SIGNIFICANT CHANGE UP
-  ERTAPENEM: SIGNIFICANT CHANGE UP
-  GENTAMICIN: SIGNIFICANT CHANGE UP
-  IMIPENEM: SIGNIFICANT CHANGE UP
-  LEVOFLOXACIN: SIGNIFICANT CHANGE UP
-  MEROPENEM: SIGNIFICANT CHANGE UP
-  NITROFURANTOIN: SIGNIFICANT CHANGE UP
-  PIPERACILLIN/TAZOBACTAM: SIGNIFICANT CHANGE UP
-  TIGECYCLINE: SIGNIFICANT CHANGE UP
-  TOBRAMYCIN: SIGNIFICANT CHANGE UP
-  TRIMETHOPRIM/SULFAMETHOXAZOLE: SIGNIFICANT CHANGE UP
ANION GAP SERPL CALC-SCNC: 12 MMOL/L — SIGNIFICANT CHANGE UP (ref 7–14)
BUN SERPL-MCNC: 9 MG/DL — SIGNIFICANT CHANGE UP (ref 7–23)
CALCIUM SERPL-MCNC: 8.9 MG/DL — SIGNIFICANT CHANGE UP (ref 8.4–10.5)
CHLORIDE SERPL-SCNC: 104 MMOL/L — SIGNIFICANT CHANGE UP (ref 98–107)
CO2 SERPL-SCNC: 21 MMOL/L — LOW (ref 22–31)
CREAT SERPL-MCNC: 0.71 MG/DL — SIGNIFICANT CHANGE UP (ref 0.5–1.3)
CULTURE RESULTS: SIGNIFICANT CHANGE UP
GLUCOSE SERPL-MCNC: 93 MG/DL — SIGNIFICANT CHANGE UP (ref 70–99)
HCT VFR BLD CALC: 37.4 % — SIGNIFICANT CHANGE UP (ref 34.5–45)
HGB BLD-MCNC: 12 G/DL — SIGNIFICANT CHANGE UP (ref 11.5–15.5)
MAGNESIUM SERPL-MCNC: 1.9 MG/DL — SIGNIFICANT CHANGE UP (ref 1.6–2.6)
MCHC RBC-ENTMCNC: 28 PG — SIGNIFICANT CHANGE UP (ref 27–34)
MCHC RBC-ENTMCNC: 32.1 GM/DL — SIGNIFICANT CHANGE UP (ref 32–36)
MCV RBC AUTO: 87.4 FL — SIGNIFICANT CHANGE UP (ref 80–100)
METHOD TYPE: SIGNIFICANT CHANGE UP
NRBC # BLD: 0 /100 WBCS — SIGNIFICANT CHANGE UP
NRBC # FLD: 0 K/UL — SIGNIFICANT CHANGE UP
ORGANISM # SPEC MICROSCOPIC CNT: SIGNIFICANT CHANGE UP
ORGANISM # SPEC MICROSCOPIC CNT: SIGNIFICANT CHANGE UP
PHOSPHATE SERPL-MCNC: 2.7 MG/DL — SIGNIFICANT CHANGE UP (ref 2.5–4.5)
PLATELET # BLD AUTO: 221 K/UL — SIGNIFICANT CHANGE UP (ref 150–400)
POTASSIUM SERPL-MCNC: 3.4 MMOL/L — LOW (ref 3.5–5.3)
POTASSIUM SERPL-SCNC: 3.4 MMOL/L — LOW (ref 3.5–5.3)
RBC # BLD: 4.28 M/UL — SIGNIFICANT CHANGE UP (ref 3.8–5.2)
RBC # FLD: 14.2 % — SIGNIFICANT CHANGE UP (ref 10.3–14.5)
SODIUM SERPL-SCNC: 137 MMOL/L — SIGNIFICANT CHANGE UP (ref 135–145)
SPECIMEN SOURCE: SIGNIFICANT CHANGE UP
WBC # BLD: 6.65 K/UL — SIGNIFICANT CHANGE UP (ref 3.8–10.5)
WBC # FLD AUTO: 6.65 K/UL — SIGNIFICANT CHANGE UP (ref 3.8–10.5)

## 2021-03-12 RX ORDER — POTASSIUM CHLORIDE 20 MEQ
40 PACKET (EA) ORAL ONCE
Refills: 0 | Status: COMPLETED | OUTPATIENT
Start: 2021-03-12 | End: 2021-03-12

## 2021-03-12 RX ADMIN — Medication 200 MILLIGRAM(S): at 05:50

## 2021-03-12 RX ADMIN — SODIUM CHLORIDE 75 MILLILITER(S): 9 INJECTION, SOLUTION INTRAVENOUS at 05:50

## 2021-03-12 RX ADMIN — Medication 40 MILLIEQUIVALENT(S): at 12:49

## 2021-03-12 RX ADMIN — Medication 200 MILLIGRAM(S): at 17:49

## 2021-03-12 RX ADMIN — ENOXAPARIN SODIUM 40 MILLIGRAM(S): 100 INJECTION SUBCUTANEOUS at 12:48

## 2021-03-12 RX ADMIN — BENZOCAINE AND MENTHOL 1 LOZENGE: 5; 1 LIQUID ORAL at 07:39

## 2021-03-12 RX ADMIN — SODIUM CHLORIDE 75 MILLILITER(S): 9 INJECTION, SOLUTION INTRAVENOUS at 12:49

## 2021-03-12 NOTE — DISCHARGE NOTE PROVIDER - CARE PROVIDER_API CALL
Court Renner  COLON/RECTAL SURGERY  [address] [facility] [address]  [place] [address]  Phone: [phone]  Fax: [phone]  Follow Up Time:

## 2021-03-12 NOTE — DISCHARGE NOTE PROVIDER - NSDCFUADDINST_GEN_ALL_CORE_FT
WOUND CARE:  Please keep incisions clean and dry. Please do not Scrub or rub incisions. Do not use lotion or powder on incisions.   BATHING: You may shower and/or sponge bathe. You may use warm soapy water in the shower and rinse, pat dry.  ACTIVITY: No heavy lifting or straining. Otherwise, you may return to your usual level of physical activity. If you are taking narcotic pain medication DO NOT drive a car, operate machinery or make important decisions.  DIET: Return to your usual diet.  NOTIFY YOUR SURGEON IF YOU HAVE: any bleeding that does not stop, any pus draining from your wound(s), any fever (over 100.4 F) persistent nausea/vomiting, or if your pain is not controlled on your discharge pain medications, unable to urinate.  Please follow up with your primary care physician in one week regarding your hospitalization, bring copies of your discharge paperwork.  Please follow up with your surgeon, Dr. Renner as an outpatient, please call to schedule appointment

## 2021-03-12 NOTE — PROGRESS NOTE ADULT - ASSESSMENT
Assessment and Plan:  75F s/p lar () now p/w 2d of abd pain nausea / vomiting and found to have sbo, likely adhesive in nature. Patient currently on floor with ngt in place awaiting return of gi function. Patient underwent gastrograafin challenge with +/+ BF yesterday.     - diet: CLD  - IVF: LR @ 75cc/h  - pain control  - IV cipro for uti, f/u cx  - vte ppx  - encourage ambulation / cough / deep breathing / incentive spirometry   - serial abd exams  - no pain medication w/o abd exam by surgical team.       A team surgery [id number]      A team surgery [id number]   Assessment and Plan:  75F s/p lar () now p/w 2d of abd pain nausea / vomiting and found to have sbo, likely adhesive in nature. Patient underwent gastrograafin challenge with +/+ BF yesterday.  Patient currently on floor with gi function.      - diet: CLD  - IVF: LR @ 75cc/h  - pain control  - IV cipro for uti, f/u cx  - vte ppx  - encourage ambulation / cough / deep breathing / incentive spirometry   - no pain medication w/o abd exam by surgical team.           A team surgery [id number]

## 2021-03-12 NOTE — CONSULT NOTE ADULT - ASSESSMENT
75F hx of diverticulosis c/b colovesical fistula s/p LAR 1/11/21 admitted to surgery w/ SBO now improving w/o surgical intervention w/ finding of anterior bladder wall thickening on recent CT at admission, concern for ?mass. Radiologic findings likely represent mural thickening 2/2 fistula and less to be a bladder mass.      --No acute urologic intervention needed while inpatient  --Continue management of SBO per primary team  --Continue w/ cipro for treatment of UTI.  Tailor w/ sensitivities  --Plan for outpatient f/u with [facility] for outpatient cystoscopy to evaluate bladder. ([phone])    D/w Dr. Nenita Medley  [id number]    75F hx of diverticulosis c/b colovesical fistula s/p LAR 1/11/21 admitted to surgery w/ SBO now improving w/o surgical intervention w/ finding of anterior bladder wall thickening on recent CT at admission, concern for ?mass. Radiologic findings likely represent wall thickening 2/2 hx of colovesical fistula and less likely to be a bladder mass.      --No acute urologic intervention needed while inpatient  --Continue management of SBO per primary team  --Continue w/ cipro for treatment of UTI.  Tailor w/ sensitivities  --Plan for f/u with [facility] for outpatient cystoscopy to evaluate bladder. ([phone])    Discussed w/ Dr. Nenita Medley  [id number]

## 2021-03-12 NOTE — DISCHARGE NOTE PROVIDER - HOSPITAL COURSE
75M s/p lar (1.11.21) now p/w 2d of abd pain nausea / vomiting and no flatus. Patient states she had onset of crampy abd pain for 2d prior to presentation. Evening prior to presentatin pain was very severe and prevented patient from sleeping. Has had last BM 1d prior to presentation, unsure last flatus. Denies fevers / chills / ha / cp / sob / melena / hematemesis. CT scan done on admission revealed: Small bowel obstruction with transition point in the mid abdomen.    Patient was admitted and NGT was placed for bowel decompression. Serial abdominal exams were done. Patient was given gastrograffin challenge and on HD #2 began passing flatus and having + BMs. Her diet was slowly advanced as tolerated. Once she was tolerating regular diet and abdominal pain had resolved, she was found to be stable for discharge to home. 75M s/p lar (1.11.21) now p/w 2d of abd pain nausea / vomiting and no flatus. Patient states she had onset of crampy abd pain for 2d prior to presentation. Evening prior to presentatin pain was very severe and prevented patient from sleeping. Has had last BM 1d prior to presentation, unsure last flatus. Denies fevers / chills / ha / cp / sob / melena / hematemesis. CT scan done on admission revealed: Small bowel obstruction with transition point in the mid abdomen.    Patient was admitted and NGT was placed for bowel decompression. Serial abdominal exams were done. Patient was given gastrograffin challenge and on HD #2 began passing flatus and having + BMs. Her diet was slowly advanced as tolerated. Once she was tolerating regular diet and abdominal pain had resolved, she was found to be stable for discharge to home.  Patient will be discharged with 2 additional days of Cipro for UTI.  Patient hemodynamically and medically stable prior to discharge.

## 2021-03-12 NOTE — PROGRESS NOTE ADULT - SUBJECTIVE AND OBJECTIVE BOX
DATE OF SERVICE: 03-12-21 @ 09:52    INTERVAL HPI/OVERNIGHT EVENTS: Patient seen and examined, resting comfortably at bedside awake and alert. No acute events overnight. Patient reports tolerating diet without nausea, vomiting. Admits to passing flatus and having bowel movements. Patient endorses voiding without issues and has been out of bed and ambulating. Denies fever, chills, SOB, or chest pain. Pain well controlled.        MEDICATIONS  (STANDING):  ciprofloxacin   IVPB      ciprofloxacin   IVPB 400 milliGRAM(s) IV Intermittent every 12 hours  enoxaparin Injectable 40 milliGRAM(s) SubCutaneous daily  influenza  Vaccine (HIGH DOSE) 0.7 milliLiter(s) IntraMuscular once  lactated ringers. 1000 milliLiter(s) (75 mL/Hr) IV Continuous <Continuous>  potassium chloride   Powder 40 milliEquivalent(s) Oral once    MEDICATIONS  (PRN):  benzocaine 15 mG/menthol 3.6 mG (Sugar-Free) Lozenge 1 Lozenge Oral every 2 hours PRN Sore Throat      Vital Signs Last 24 Hrs  T(C): 36.8 (12 Mar 2021 06:00), Max: 36.9 (12 Mar 2021 02:12)  T(F): 98.2 (12 Mar 2021 06:00), Max: 98.4 (12 Mar 2021 02:12)  HR: 60 (12 Mar 2021 06:00) (60 - 68)  BP: 147/73 (12 Mar 2021 06:00) (132/67 - 147/82)  BP(mean): --  RR: 16 (12 Mar 2021 06:00) (16 - 18)  SpO2: 97% (12 Mar 2021 06:00) (95% - 98%)    PHYSICAL EXAM:    Constitutional: NAD awake and alert   Respiratory: breathing comfortably on room air   Gastrointestinal: abdomen soft, nontender, nondistended      I&O's Detail    11 Mar 2021 07:01  -  12 Mar 2021 07:00  --------------------------------------------------------  IN:    IV PiggyBack: 350 mL    Lactated Ringers: 1550 mL    Oral Fluid: 50 mL  Total IN: 1950 mL    OUT:    Voided (mL): 250 mL  Total OUT: 250 mL    Total NET: 1700 mL          LABS:                        12.0   6.65  )-----------( 221      ( 12 Mar 2021 06:51 )             37.4     03-12    137  |  104  |  9   ----------------------------<  93  3.4<L>   |  21<L>  |  0.71    Ca    8.9      12 Mar 2021 06:51  Phos  2.7     03-12  Mg     1.9     03-12    TPro  7.5  /  Alb  4.7  /  TBili  1.0  /  DBili  x   /  AST  19  /  ALT  10  /  AlkPhos  75  03-10    PT/INR - ( 10 Mar 2021 10:59 )   PT: 12.5 sec;   INR: 1.10 ratio           Urinalysis Basic - ( 10 Mar 2021 13:40 )    Color: Yellow / Appearance: Clear / SG: >1.050 / pH: x  Gluc: x / Ketone: Small  / Bili: Negative / Urobili: 3 mg/dL   Blood: x / Protein: 30 mg/dL / Nitrite: Positive   Leuk Esterase: Small / RBC: 5-10 /HPF / WBC 10-20 /HPF   Sq Epi: x / Non Sq Epi: x / Bacteria: x        RADIOLOGY & ADDITIONAL STUDIES:

## 2021-03-12 NOTE — PROGRESS NOTE ADULT - ASSESSMENT
75 year old female presented with abdominal pain, nausea and vomiting and found to have SBO. Pt passed clamp trial and NGT was removed. Pt reports bowel function. Recovering appropriately on floor.     Plan:   Diet: Clear liquid  IV ciprofloxacin d/t UTI, f/u cx results   Encourage OOB and ambulating   IS   DVT ppx: Lovenox   Urology consulted for recent CT with persistent anterior bladder mass

## 2021-03-12 NOTE — PROGRESS NOTE ADULT - SUBJECTIVE AND OBJECTIVE BOX
A Team Surgery Daily Progress Note  =====================================================    SUBJECTIVE: Patient seen and examined at bedside on AM rounds. Patient reports that they're feeling well. NPO,, denies nausea, vomiting.   + Flatus/   + BM. OOB/Ambulating as tolerated. Denies fever, chills.     24 HOUR EVENTS: gastrografin challenge, NGT dc'd    MEDICATIONS  (STANDING):  ciprofloxacin   IVPB      ciprofloxacin   IVPB 400 milliGRAM(s) IV Intermittent every 12 hours  enoxaparin Injectable 40 milliGRAM(s) SubCutaneous daily  influenza  Vaccine (HIGH DOSE) 0.7 milliLiter(s) IntraMuscular once  lactated ringers. 1000 milliLiter(s) (100 mL/Hr) IV Continuous <Continuous>    MEDICATIONS  (PRN):  benzocaine 15 mG/menthol 3.6 mG (Sugar-Free) Lozenge 1 Lozenge Oral every 2 hours PRN Sore Throat      OBJECTIVE:    Vital Signs Last 24 Hrs  T(C): 36.9 (12 Mar 2021 02:12), Max: 37.2 (11 Mar 2021 06:08)  T(F): 98.4 (12 Mar 2021 02:12), Max: 98.9 (11 Mar 2021 06:08)  HR: 67 (12 Mar 2021 02:12) (60 - 68)  BP: 145/79 (12 Mar 2021 02:12) (132/67 - 148/72)  BP(mean): --  RR: 17 (12 Mar 2021 02:12) (16 - 18)  SpO2: 95% (12 Mar 2021 02:12) (95% - 98%)    PHYSICAL EXAM:  Gen: WD, WN, in no acute distress.  HEENT: PERRLA, EOMI. oropharynx clear.   Lungs: Respirations unlabored.   Abd: Soft, non-tender, distended.  I&O's Detail    10 Mar 2021 07:01  -  11 Mar 2021 07:00  --------------------------------------------------------  IN:  Total IN: 0 mL    OUT:    Nasogastric/Oral tube (mL): 200 mL  Total OUT: 200 mL    Total NET: -200 mL      11 Mar 2021 07:01  -  12 Mar 2021 04:19  --------------------------------------------------------  IN:    IV PiggyBack: 200 mL    Lactated Ringers: 1200 mL  Total IN: 1400 mL    OUT:    Oral Fluid: 0 mL  Total OUT: 0 mL    Total NET: 1400 mL          Daily     Daily Weight in k.7 (11 Mar 2021 10:28)    LABS:                        12.2   8.67  )-----------( 225      ( 11 Mar 2021 08:20 )             40.3     03-11    138  |  103  |  11  ----------------------------<  106<H>  3.6   |  22  |  0.71    Ca    8.9      11 Mar 2021 08:20  Phos  3.2     03-11  Mg     1.9     03-11    TPro  7.5  /  Alb  4.7  /  TBili  1.0  /  DBili  x   /  AST  19  /  ALT  10  /  AlkPhos  75  03-10    PT/INR - ( 10 Mar 2021 10:59 )   PT: 12.5 sec;   INR: 1.10 ratio           Urinalysis Basic - ( 10 Mar 2021 13:40 )    Color: Yellow / Appearance: Clear / SG: >1.050 / pH: x  Gluc: x / Ketone: Small  / Bili: Negative / Urobili: 3 mg/dL   Blood: x / Protein: 30 mg/dL / Nitrite: Positive   Leuk Esterase: Small / RBC: 5-10 /HPF / WBC 10-20 /HPF   Sq Epi: x / Non Sq Epi: x / Bacteria: x                         A Team Surgery Daily Progress Note  =====================================================    SUBJECTIVE: Patient seen and examined at bedside on AM rounds. Patient reports that they're feeling well. Denies nausea, vomiting.   + Flatus/   + BM. OOB/Ambulating as tolerated. Denies fever, chills.     24 HOUR EVENTS: No acute events overnight. NGT d/c.    MEDICATIONS  (STANDING):  ciprofloxacin   IVPB      ciprofloxacin   IVPB 400 milliGRAM(s) IV Intermittent every 12 hours  enoxaparin Injectable 40 milliGRAM(s) SubCutaneous daily  influenza  Vaccine (HIGH DOSE) 0.7 milliLiter(s) IntraMuscular once  lactated ringers. 1000 milliLiter(s) (75 mL/Hr) IV Continuous <Continuous>  potassium chloride   Powder 40 milliEquivalent(s) Oral once    MEDICATIONS  (PRN):  benzocaine 15 mG/menthol 3.6 mG (Sugar-Free) Lozenge 1 Lozenge Oral every 2 hours PRN Sore Throat    OBJECTIVE:    Vital Signs Last 24 Hrs  T(C): 36.3 (12 Mar 2021 10:00), Max: 36.9 (12 Mar 2021 02:12)  T(F): 97.4 (12 Mar 2021 10:00), Max: 98.4 (12 Mar 2021 02:12)  HR: 65 (12 Mar 2021 10:00) (60 - 68)  BP: 155/87 (12 Mar 2021 10:00) (132/67 - 155/87)  BP(mean): --  ABP: --  ABP(mean): --  RR: 16 (12 Mar 2021 10:00) (16 - 18)  SpO2: 97% (12 Mar 2021 10:00) (95% - 98%)      PHYSICAL EXAM:  Gen: WD, WN, in no acute distress.  HEENT: PERRLA, EOMI. oropharynx clear.   Lungs: Respirations unlabored.   Abd: Soft, non-tender, distended.    I&O's Detail  11 Mar 2021 07:01  -  12 Mar 2021 07:00  --------------------------------------------------------  IN:    IV PiggyBack: 350 mL    Lactated Ringers: 1550 mL    Oral Fluid: 50 mL  Total IN: 1950 mL    OUT:    Voided (mL): 250 mL  Total OUT: 250 mL    Total NET: 1700 mL          LABS:                        12.0   6.65  )-----------( 221      ( 12 Mar 2021 06:51 )             37.4       03-12    137  |  104  |  9   ----------------------------<  93  3.4<L>   |  21<L>  |  0.71    Ca    8.9      12 Mar 2021 06:51  Phos  2.7     03-12  Mg     1.9     03-12    TPro  7.5  /  Alb  4.7  /  TBili  1.0  /  DBili  x   /  AST  19  /  ALT  10  /  AlkPhos  75  03-10          Urinalysis Basic - ( 10 Mar 2021 13:40 )    Color: Yellow / Appearance: Clear / SG: >1.050 / pH: x  Gluc: x / Ketone: Small  / Bili: Negative / Urobili: 3 mg/dL   Blood: x / Protein: 30 mg/dL / Nitrite: Positive   Leuk Esterase: Small / RBC: 5-10 /HPF / WBC 10-20 /HPF   Sq Epi: x / Non Sq Epi: x / Bacteria: x        PT/INR - ( 10 Mar 2021 10:59 )   PT: 12.5 sec;   INR: 1.10 ratio                   CAPILLARY BLOOD GLUCOSE

## 2021-03-12 NOTE — CONSULT NOTE ADULT - SUBJECTIVE AND OBJECTIVE BOX
HPI    75F hx of diverticulosis c/b colovesical fistula s/p LAR presenting w/ 2 day abdominal pain nausea/vomiting now admitted to surgery for management of SBO.     PAST MEDICAL & SURGICAL HISTORY:  Diverticulosis    Former smoker  quit 30 yrs ago    Diverticulitis of large intestine with perforation and abscess without bleeding    Obesity    Atrial fibrillation  on no meds    Abscess of bladder  October 2020    Hyperlipidemia    History of tonsillectomy    History of tubal ligation        MEDICATIONS  (STANDING):  ciprofloxacin   IVPB      ciprofloxacin   IVPB 400 milliGRAM(s) IV Intermittent every 12 hours  enoxaparin Injectable 40 milliGRAM(s) SubCutaneous daily  influenza  Vaccine (HIGH DOSE) 0.7 milliLiter(s) IntraMuscular once  lactated ringers. 1000 milliLiter(s) (75 mL/Hr) IV Continuous <Continuous>  potassium chloride   Powder 40 milliEquivalent(s) Oral once    MEDICATIONS  (PRN):  benzocaine 15 mG/menthol 3.6 mG (Sugar-Free) Lozenge 1 Lozenge Oral every 2 hours PRN Sore Throat      FAMILY HISTORY:  FH: type 2 diabetes (Mother)  mother        Allergies    No Known Allergies    Intolerances        SOCIAL HISTORY:    REVIEW OF SYSTEMS: Otherwise negative as stated in HPI    Physical Exam  Vital signs  T(C): 36.3 (03-12-21 @ 10:00), Max: 36.9 (03-12-21 @ 02:12)  HR: 65 (03-12-21 @ 10:00)  BP: 155/87 (03-12-21 @ 10:00)  SpO2: 97% (03-12-21 @ 10:00)  Wt(kg): --    Output    OUT:    Voided (mL): 250 mL  Total OUT: 250 mL    Total NET: -250 mL          Gen:  NAD    Pulm:  No respiratory distress  	  CV:  RRR    GI:  S/ND/NT    :      MSK:      LABS:      03-12 @ 06:51    WBC 6.65  / Hct 37.4  / SCr 0.71     03-11 @ 08:20    WBC 8.67  / Hct 40.3  / SCr 0.71     03-12    137  |  104  |  9   ----------------------------<  93  3.4<L>   |  21<L>  |  0.71    Ca    8.9      12 Mar 2021 06:51  Phos  2.7     03-12  Mg     1.9     03-12        Urinalysis Basic - ( 10 Mar 2021 13:40 )    Color: Yellow / Appearance: Clear / SG: >1.050 / pH: x  Gluc: x / Ketone: Small  / Bili: Negative / Urobili: 3 mg/dL   Blood: x / Protein: 30 mg/dL / Nitrite: Positive   Leuk Esterase: Small / RBC: 5-10 /HPF / WBC 10-20 /HPF   Sq Epi: x / Non Sq Epi: x / Bacteria: x        Urine Cx:  Blood Cx:    RADIOLOGY:     HPI    75F hx of diverticulosis c/b colovesical fistula s/p LAR presenting w/ 2 day abdominal pain nausea/vomiting now admitted to surgery for management of SBO. She has began to open up with conservative management and has not required surgical intervention     PAST MEDICAL & SURGICAL HISTORY:  Diverticulosis    Former smoker  quit 30 yrs ago    Diverticulitis of large intestine with perforation and abscess without bleeding    Obesity    Atrial fibrillation  on no meds    Abscess of bladder  October 2020    Hyperlipidemia    History of tonsillectomy    History of tubal ligation        MEDICATIONS  (STANDING):  ciprofloxacin   IVPB      ciprofloxacin   IVPB 400 milliGRAM(s) IV Intermittent every 12 hours  enoxaparin Injectable 40 milliGRAM(s) SubCutaneous daily  influenza  Vaccine (HIGH DOSE) 0.7 milliLiter(s) IntraMuscular once  lactated ringers. 1000 milliLiter(s) (75 mL/Hr) IV Continuous <Continuous>  potassium chloride   Powder 40 milliEquivalent(s) Oral once    MEDICATIONS  (PRN):  benzocaine 15 mG/menthol 3.6 mG (Sugar-Free) Lozenge 1 Lozenge Oral every 2 hours PRN Sore Throat      FAMILY HISTORY:  FH: type 2 diabetes (Mother)  mother        Allergies    No Known Allergies    Intolerances        SOCIAL HISTORY:    REVIEW OF SYSTEMS: Otherwise negative as stated in HPI    Physical Exam  Vital signs  T(C): 36.3 (03-12-21 @ 10:00), Max: 36.9 (03-12-21 @ 02:12)  HR: 65 (03-12-21 @ 10:00)  BP: 155/87 (03-12-21 @ 10:00)  SpO2: 97% (03-12-21 @ 10:00)  Wt(kg): --    Output    OUT:    Voided (mL): 250 mL  Total OUT: 250 mL    Total NET: -250 mL          Gen:  NAD    Pulm:  No respiratory distress  	  CV:  RRR    GI:  soft, mildly tender, nondistended.     :      MSK:      LABS:      03-12 @ 06:51    WBC 6.65  / Hct 37.4  / SCr 0.71     03-11 @ 08:20    WBC 8.67  / Hct 40.3  / SCr 0.71     03-12    137  |  104  |  9   ----------------------------<  93  3.4<L>   |  21<L>  |  0.71    Ca    8.9      12 Mar 2021 06:51  Phos  2.7     03-12  Mg     1.9     03-12        Urinalysis Basic - ( 10 Mar 2021 13:40 )    Color: Yellow / Appearance: Clear / SG: >1.050 / pH: x  Gluc: x / Ketone: Small  / Bili: Negative / Urobili: 3 mg/dL   Blood: x / Protein: 30 mg/dL / Nitrite: Positive   Leuk Esterase: Small / RBC: 5-10 /HPF / WBC 10-20 /HPF   Sq Epi: x / Non Sq Epi: x / Bacteria: x        Urine Cx:  Blood Cx:    RADIOLOGY:     HPI    75F hx of diverticulosis c/b colovesical fistula s/p LAR 1/11/21 presenting w/ 2 day abdominal pain nausea/vomiting now admitted to surgery for management of SBO. She has began to open up with conservative management and has not required surgical intervention during this stay. Urology is being consulted for evaluation of abnormal bladder wall thickening seen on admission CT (3/10/21) originally read as concern for bladder mass. This finding of bladder wall thickening at the anterior of the bladder has been seen on prior scan (1/18/21). No demonstration of prior colovesical fistula on scans from 3/10 and 1/18. Pt reports no recent gross hematuria. Pt former smoker, no known occupational exposures. Of note, pt is currently being treated for E. coli UTI by primary team with cipro.    PAST MEDICAL & SURGICAL HISTORY:  Diverticulosis    Former smoker  quit 30 yrs ago    Diverticulitis of large intestine with perforation and abscess without bleeding    Obesity    Atrial fibrillation  on no meds    Abscess of bladder  October 2020    Hyperlipidemia    History of tonsillectomy    History of tubal ligation        MEDICATIONS  (STANDING):  ciprofloxacin   IVPB      ciprofloxacin   IVPB 400 milliGRAM(s) IV Intermittent every 12 hours  enoxaparin Injectable 40 milliGRAM(s) SubCutaneous daily  influenza  Vaccine (HIGH DOSE) 0.7 milliLiter(s) IntraMuscular once  lactated ringers. 1000 milliLiter(s) (75 mL/Hr) IV Continuous <Continuous>  potassium chloride   Powder 40 milliEquivalent(s) Oral once    MEDICATIONS  (PRN):  benzocaine 15 mG/menthol 3.6 mG (Sugar-Free) Lozenge 1 Lozenge Oral every 2 hours PRN Sore Throat      FAMILY HISTORY:  FH: type 2 diabetes (Mother)  mother        Allergies    No Known Allergies    Intolerances        SOCIAL HISTORY:    REVIEW OF SYSTEMS: Otherwise negative as stated in HPI    Physical Exam  Vital signs  T(C): 36.3 (03-12-21 @ 10:00), Max: 36.9 (03-12-21 @ 02:12)  HR: 65 (03-12-21 @ 10:00)  BP: 155/87 (03-12-21 @ 10:00)  SpO2: 97% (03-12-21 @ 10:00)  Wt(kg): --    Output    OUT:    Voided (mL): 250 mL  Total OUT: 250 mL    Total NET: -250 mL          Gen:  NAD    Pulm:  No respiratory distress  	  CV:  RRR    GI:  soft, mildly tender, nondistended.     :  voiding spontaneously.     PSYCH:  AOx3     LABS:      03-12 @ 06:51    WBC 6.65  / Hct 37.4  / SCr 0.71     03-11 @ 08:20    WBC 8.67  / Hct 40.3  / SCr 0.71     03-12    137  |  104  |  9   ----------------------------<  93  3.4<L>   |  21<L>  |  0.71    Ca    8.9      12 Mar 2021 06:51  Phos  2.7     03-12  Mg     1.9     03-12        Urinalysis Basic - ( 10 Mar 2021 13:40 )    Color: Yellow / Appearance: Clear / SG: >1.050 / pH: x  Gluc: x / Ketone: Small  / Bili: Negative / Urobili: 3 mg/dL   Blood: x / Protein: 30 mg/dL / Nitrite: Positive   Leuk Esterase: Small / RBC: 5-10 /HPF / WBC 10-20 /HPF   Sq Epi: x / Non Sq Epi: x / Bacteria: x        Urine Cx: E. coli     RADIOLOGY:    EXAM:  CT ABDOMEN AND PELVIS OC IC        PROCEDURE DATE:  Mar 10 2021         INTERPRETATION:  CLINICAL INFORMATION: abdominal pain, n/v    COMPARISON: 1.18.21.    CONTRAST/COMPLICATIONS:  IV Contrast: Omnipaque 350 / 90 cc administered / 10 cc discarded.  Oral Contrast: Omnipaque 300 + Fruit 2o  Complications: None reported at time of study completion    PROCEDURE:  CT of the Abdomen and Pelvis was performed.  Sagittal and coronal reformats were performed.    FINDINGS:    LOWER CHEST: Elevated left diaphragm.    LIVER: Cysts and other lesions too small to characterize.  BILE DUCTS: Normal caliber.  GALLBLADDER: Within normal limits.  SPLEEN: Within normal limits.  PANCREAS: Within normal limits.  ADRENALS: Within normal limits.  KIDNEYS/URETERS: Subcentimeter lesions too small to characterize.    BLADDER: Suggestion of anterior bladder mass.  REPRODUCTIVE ORGANS: Within normal limits.    BOWEL: There is a small bowel obstruction with transition point in the mid abdomen on series 2 image 64. Rectosigmoid anastamosis. Normal appendix.  PERITONEUM: Mild ascites.  VESSELS:  Within normal limits.  RETROPERITONEUM/LYMPH NODES: No lymphadenopathy.  ABDOMINAL WALL: Postsurgical changes.  BONES: Within normal limits.    IMPRESSION: Small bowel obstruction with transition point in the mid abdomen.    Anterior bladder mass; urology consultation recommended.                    ANJANA SHOEMAKER MD; Attending Radiologist  This document has been electronically signed. Mar 10 2021  1:19PM    `

## 2021-03-12 NOTE — DISCHARGE NOTE PROVIDER - NSDCMRMEDTOKEN_GEN_ALL_CORE_FT
Bactrim  mg-160 mg oral tablet: 1 milligram(s) orally 2 times a day   Diflucan 200 mg oral tablet: 1 tab(s) orally once a day MDD:1  simvastatin 20 mg oral tablet: 1 tab(s) orally once a day (at bedtime)   ciprofloxacin 500 mg oral tablet: 1 tab(s) orally every 12 hours  ciprofloxacin 500 mg oral tablet: 1 tab(s) orally every 12 hours  simvastatin 20 mg oral tablet: 1 tab(s) orally once a day (at bedtime)

## 2021-03-13 LAB
ANION GAP SERPL CALC-SCNC: 9 MMOL/L — SIGNIFICANT CHANGE UP (ref 7–14)
BUN SERPL-MCNC: 7 MG/DL — SIGNIFICANT CHANGE UP (ref 7–23)
CALCIUM SERPL-MCNC: 9.1 MG/DL — SIGNIFICANT CHANGE UP (ref 8.4–10.5)
CHLORIDE SERPL-SCNC: 104 MMOL/L — SIGNIFICANT CHANGE UP (ref 98–107)
CO2 SERPL-SCNC: 24 MMOL/L — SIGNIFICANT CHANGE UP (ref 22–31)
CREAT SERPL-MCNC: 0.73 MG/DL — SIGNIFICANT CHANGE UP (ref 0.5–1.3)
CULTURE RESULTS: NO GROWTH — SIGNIFICANT CHANGE UP
GLUCOSE SERPL-MCNC: 106 MG/DL — HIGH (ref 70–99)
HCT VFR BLD CALC: 37.9 % — SIGNIFICANT CHANGE UP (ref 34.5–45)
HGB BLD-MCNC: 12.3 G/DL — SIGNIFICANT CHANGE UP (ref 11.5–15.5)
MAGNESIUM SERPL-MCNC: 2.1 MG/DL — SIGNIFICANT CHANGE UP (ref 1.6–2.6)
MCHC RBC-ENTMCNC: 28 PG — SIGNIFICANT CHANGE UP (ref 27–34)
MCHC RBC-ENTMCNC: 32.5 GM/DL — SIGNIFICANT CHANGE UP (ref 32–36)
MCV RBC AUTO: 86.1 FL — SIGNIFICANT CHANGE UP (ref 80–100)
NRBC # BLD: 0 /100 WBCS — SIGNIFICANT CHANGE UP
NRBC # FLD: 0 K/UL — SIGNIFICANT CHANGE UP
PHOSPHATE SERPL-MCNC: 3.3 MG/DL — SIGNIFICANT CHANGE UP (ref 2.5–4.5)
PLATELET # BLD AUTO: 215 K/UL — SIGNIFICANT CHANGE UP (ref 150–400)
POTASSIUM SERPL-MCNC: 3.8 MMOL/L — SIGNIFICANT CHANGE UP (ref 3.5–5.3)
POTASSIUM SERPL-SCNC: 3.8 MMOL/L — SIGNIFICANT CHANGE UP (ref 3.5–5.3)
RBC # BLD: 4.4 M/UL — SIGNIFICANT CHANGE UP (ref 3.8–5.2)
RBC # FLD: 13.9 % — SIGNIFICANT CHANGE UP (ref 10.3–14.5)
SODIUM SERPL-SCNC: 137 MMOL/L — SIGNIFICANT CHANGE UP (ref 135–145)
SPECIMEN SOURCE: SIGNIFICANT CHANGE UP
WBC # BLD: 5.57 K/UL — SIGNIFICANT CHANGE UP (ref 3.8–10.5)
WBC # FLD AUTO: 5.57 K/UL — SIGNIFICANT CHANGE UP (ref 3.8–10.5)

## 2021-03-13 RX ORDER — ACETAMINOPHEN 500 MG
650 TABLET ORAL ONCE
Refills: 0 | Status: COMPLETED | OUTPATIENT
Start: 2021-03-13 | End: 2021-03-13

## 2021-03-13 RX ORDER — CIPROFLOXACIN LACTATE 400MG/40ML
500 VIAL (ML) INTRAVENOUS EVERY 12 HOURS
Refills: 0 | Status: DISCONTINUED | OUTPATIENT
Start: 2021-03-13 | End: 2021-03-14

## 2021-03-13 RX ORDER — POLYETHYLENE GLYCOL 3350 17 G/17G
17 POWDER, FOR SOLUTION ORAL ONCE
Refills: 0 | Status: COMPLETED | OUTPATIENT
Start: 2021-03-13 | End: 2021-03-13

## 2021-03-13 RX ADMIN — Medication 200 MILLIGRAM(S): at 05:30

## 2021-03-13 RX ADMIN — ENOXAPARIN SODIUM 40 MILLIGRAM(S): 100 INJECTION SUBCUTANEOUS at 14:23

## 2021-03-13 RX ADMIN — Medication 500 MILLIGRAM(S): at 19:03

## 2021-03-13 NOTE — PROGRESS NOTE ADULT - ASSESSMENT
Assessment and Plan:  75F s/p lar () now p/w 2d of abd pain nausea / vomiting and found to have sbo, likely adhesive in nature. Patient underwent gastrograafin challenge with +/+ BF yesterday.  Patient currently on floor with gi function.      - diet: Reg  - pain control  - IV cipro for uti, f/u cx, outpatient f/u for cytoscopy  - vte ppx  - encourage ambulation / cough / deep breathing / incentive spirometry   - Dispo: DC home    A team surgery [id number]      A team surgery [id number]   Assessment and Plan:  75F s/p lar () now p/w 2d of abd pain nausea / vomiting and found to have sbo, likely adhesive in nature. Patient underwent gastrograafin challenge with +/+ BF yesterday.  Patient currently on floor with gi function.      - diet: Reg  - pain control  - IV cipro for uti, f/u cx, outpatient f/u for cytoscopy  - vte ppx  - encourage ambulation / cough / deep breathing / incentive spirometry   - Dispo: DC home    A team surgery [id number]

## 2021-03-13 NOTE — PROGRESS NOTE ADULT - SUBJECTIVE AND OBJECTIVE BOX
A Team Surgery Daily Progress Note  =====================================================    SUBJECTIVE: Patient seen and examined at bedside on AM rounds. Patient reports that they're feeling well. Tolerating diet, denies nausea, vomiting.    +Flatus/   + BM. OOB/Ambulating as tolerated. Denies fever, chills.     24 HOUR EVENTS: advanced to Reg diet, continued IV cipro    MEDICATIONS  (STANDING):  ciprofloxacin   IVPB      ciprofloxacin   IVPB 400 milliGRAM(s) IV Intermittent every 12 hours  enoxaparin Injectable 40 milliGRAM(s) SubCutaneous daily  influenza  Vaccine (HIGH DOSE) 0.7 milliLiter(s) IntraMuscular once    MEDICATIONS  (PRN):  benzocaine 15 mG/menthol 3.6 mG (Sugar-Free) Lozenge 1 Lozenge Oral every 2 hours PRN Sore Throat      OBJECTIVE:    Vital Signs Last 24 Hrs  T(C): 36.2 (12 Mar 2021 21:35), Max: 36.9 (12 Mar 2021 02:12)  T(F): 97.1 (12 Mar 2021 21:35), Max: 98.4 (12 Mar 2021 02:12)  HR: 60 (12 Mar 2021 21:35) (57 - 67)  BP: 143/63 (12 Mar 2021 21:35) (138/86 - 155/87)  BP(mean): --  RR: 17 (12 Mar 2021 21:35) (16 - 17)  SpO2: 98% (12 Mar 2021 21:35) (95% - 98%)    PHYSICAL EXAM:  Constitutional: NAD awake and alert   Respiratory: breathing comfortably on room air   Gastrointestinal: abdomen soft, nontender, nondistended            I&O's Detail    11 Mar 2021 07:01  -  12 Mar 2021 07:00  --------------------------------------------------------  IN:    IV PiggyBack: 350 mL    Lactated Ringers: 1550 mL    Oral Fluid: 50 mL  Total IN: 1950 mL    OUT:    Voided (mL): 250 mL  Total OUT: 250 mL    Total NET: 1700 mL      12 Mar 2021 07:01  -  13 Mar 2021 01:07  --------------------------------------------------------  IN:    Lactated Ringers: 600 mL  Total IN: 600 mL    OUT:  Total OUT: 0 mL    Total NET: 600 mL          Daily     Daily     LABS:                        12.0   6.65  )-----------( 221      ( 12 Mar 2021 06:51 )             37.4     03-12    137  |  104  |  9   ----------------------------<  93  3.4<L>   |  21<L>  |  0.71    Ca    8.9      12 Mar 2021 06:51  Phos  2.7     03-12  Mg     1.9     03-12

## 2021-03-14 ENCOUNTER — TRANSCRIPTION ENCOUNTER (OUTPATIENT)
Age: 76
End: 2021-03-14

## 2021-03-14 VITALS
TEMPERATURE: 98 F | SYSTOLIC BLOOD PRESSURE: 135 MMHG | RESPIRATION RATE: 18 BRPM | OXYGEN SATURATION: 98 % | DIASTOLIC BLOOD PRESSURE: 76 MMHG | HEART RATE: 72 BPM

## 2021-03-14 RX ORDER — CIPROFLOXACIN LACTATE 400MG/40ML
1 VIAL (ML) INTRAVENOUS
Qty: 0 | Refills: 0 | DISCHARGE
Start: 2021-03-14

## 2021-03-14 RX ORDER — POLYETHYLENE GLYCOL 3350 17 G/17G
17 POWDER, FOR SOLUTION ORAL ONCE
Refills: 0 | Status: COMPLETED | OUTPATIENT
Start: 2021-03-14 | End: 2021-03-14

## 2021-03-14 RX ORDER — CIPROFLOXACIN LACTATE 400MG/40ML
1 VIAL (ML) INTRAVENOUS
Qty: 2 | Refills: 0
Start: 2021-03-14 | End: 2021-03-14

## 2021-03-14 RX ADMIN — Medication 500 MILLIGRAM(S): at 07:26

## 2021-03-14 NOTE — PROGRESS NOTE ADULT - ASSESSMENT
75F s/p LAR () most recently admitted for SBO. Patient underwent a gastrografin challenge during this admission for SBO and she has now had return of bowel function (+/+) without need for surgical intervention. Continues to have bowel fxn.     - diet: Reg  - pain control  - Cipro for UTI (transitioned from IV --> PO)  - Planned f/u as outpatient for urology for ?bladder wall thickening seen on CT   - VTE  - Dispo: DC home today     [id number]

## 2021-03-14 NOTE — PROGRESS NOTE ADULT - ATTENDING COMMENTS
Pt seen/examined, agree with above. resolving SBO.    - clears to LRD as tolerated  - spoke with Dr. Gutierres, will plan cystoscopy as outpatient   - cont antibx for E.coli UTI  - f/u cult sensitivity
Pt seen/examined, no complaints. Resolved SBO    - d/c home
Pt seen/examined. Overall feeling well, passing flatus, no abd pain. Denies N/V    - F/U SB protocol

## 2021-03-14 NOTE — PROGRESS NOTE ADULT - SUBJECTIVE AND OBJECTIVE BOX
GENERAL SURGERY DAILY PROGRESS NOTE:       Subjective: Patient examined at bedside. No acute events overnight.       Objective:    Vital Signs Last 24 Hrs  T(C): 36.8 (13 Mar 2021 21:52), Max: 36.8 (13 Mar 2021 02:41)  T(F): 98.3 (13 Mar 2021 21:52), Max: 98.3 (13 Mar 2021 14:03)  HR: 75 (13 Mar 2021 21:52) (52 - 75)  BP: 139/78 (13 Mar 2021 21:52) (127/74 - 144/67)  BP(mean): --  RR: 17 (13 Mar 2021 21:52) (16 - 18)  SpO2: 98% (13 Mar 2021 21:52) (98% - 100%)    I&O's Detail    12 Mar 2021 07:01  -  13 Mar 2021 07:00  --------------------------------------------------------  IN:    Lactated Ringers: 600 mL  Total IN: 600 mL    OUT:  Total OUT: 0 mL    Total NET: 600 mL          Physical Exam:    Constitutional: NAD awake and alert   Respiratory: breathing comfortably on room air   Gastrointestinal: abdomen soft, nontender, nondistended      Labaratory Results:                          12.3   5.57  )-----------( 215      ( 13 Mar 2021 07:56 )             37.9     03-13    137  |  104  |  7   ----------------------------<  106<H>  3.8   |  24  |  0.73    Ca    9.1      13 Mar 2021 07:56  Phos  3.3     03-13  Mg     2.1     03-13            Radiology and Additional Tests:    Medications:    MEDICATIONS  (STANDING):  ciprofloxacin     Tablet 500 milliGRAM(s) Oral every 12 hours  enoxaparin Injectable 40 milliGRAM(s) SubCutaneous daily  influenza  Vaccine (HIGH DOSE) 0.7 milliLiter(s) IntraMuscular once    MEDICATIONS  (PRN):  benzocaine 15 mG/menthol 3.6 mG (Sugar-Free) Lozenge 1 Lozenge Oral every 2 hours PRN Sore Throat

## 2021-03-14 NOTE — DISCHARGE NOTE NURSING/CASE MANAGEMENT/SOCIAL WORK - NSDCPNINST_GEN_ALL_CORE
Watch for signs of obstruction such as abdominal pain, nausea, and/or vomiting. Report such symptoms to the MD. No driving while taking pain medication, it causes drowsiness & constipation. Drink 6-8 glasses of fluids daily to promote hydration. No heavy lifting, pulling or pushing heavy objects. Follow up with the MD

## 2021-03-14 NOTE — DISCHARGE NOTE NURSING/CASE MANAGEMENT/SOCIAL WORK - PATIENT PORTAL LINK FT
You can access the FollowMyHealth Patient Portal offered by [facility] by registering at the following website: [email or web address]. By joining [facility]’s FollowMyHealth portal, you will also be able to view your health information using other applications (apps) compatible with our system.

## 2021-04-01 ENCOUNTER — APPOINTMENT (OUTPATIENT)
Dept: UROLOGY | Facility: CLINIC | Age: 76
End: 2021-04-01

## 2021-04-26 ENCOUNTER — APPOINTMENT (OUTPATIENT)
Dept: UROLOGY | Facility: CLINIC | Age: 76
End: 2021-04-26

## 2021-07-12 ENCOUNTER — APPOINTMENT (OUTPATIENT)
Dept: UROLOGY | Facility: CLINIC | Age: 76
End: 2021-07-12

## 2021-07-23 ENCOUNTER — APPOINTMENT (OUTPATIENT)
Dept: UROLOGY | Facility: CLINIC | Age: 76
End: 2021-07-23
Payer: MEDICARE

## 2021-07-23 ENCOUNTER — LABORATORY RESULT (OUTPATIENT)
Age: 76
End: 2021-07-23

## 2021-07-23 VITALS
OXYGEN SATURATION: 99 % | BODY MASS INDEX: 17.63 KG/M2 | DIASTOLIC BLOOD PRESSURE: 79 MMHG | SYSTOLIC BLOOD PRESSURE: 137 MMHG | WEIGHT: 119 LBS | TEMPERATURE: 99.1 F | HEART RATE: 58 BPM | HEIGHT: 69 IN

## 2021-07-23 DIAGNOSIS — Z78.9 OTHER SPECIFIED HEALTH STATUS: ICD-10-CM

## 2021-07-23 DIAGNOSIS — Z83.3 FAMILY HISTORY OF DIABETES MELLITUS: ICD-10-CM

## 2021-07-23 DIAGNOSIS — R30.0 DYSURIA: ICD-10-CM

## 2021-07-23 DIAGNOSIS — Z87.891 PERSONAL HISTORY OF NICOTINE DEPENDENCE: ICD-10-CM

## 2021-07-23 DIAGNOSIS — N32.89 OTHER SPECIFIED DISORDERS OF BLADDER: ICD-10-CM

## 2021-07-23 PROCEDURE — 99204 OFFICE O/P NEW MOD 45 MIN: CPT

## 2021-07-23 NOTE — REVIEW OF SYSTEMS
[both] : pain during and after intercourse [denies] : denies pain with orgasm [Urine Infection (bladder/kidney)] : bladder/kidney infection [Pain during urination] : pain during urination [Blood in urine that you can see] : blood visible in urine [Negative] : Heme/Lymph [FreeTextEntry6] : pain and a little blood about a week ago

## 2021-07-23 NOTE — HISTORY OF PRESENT ILLNESS
[FreeTextEntry1] : Very pleasant 75F hx of diverticulosis c/b colovesical fistula s/p LAR in Jan 2021 presents for evaluation of abnormality of bladder wall seen on CT. First demonstrated on CT cystogram in January performed as part of colovesical fistula workup, again abnormality re-demonstrated on CT Mar 2021. She was treated for a UTI while admitted to [facility]. Had some UTI like symptoms last week (dysuria, frequency, small amount of hematuria).  No other complaints.  Symptoms have resolved.

## 2021-07-23 NOTE — PHYSICAL EXAM
[General Appearance - Well Developed] : well developed [General Appearance - Well Nourished] : well nourished [Normal Appearance] : normal appearance [Well Groomed] : well groomed [General Appearance - In No Acute Distress] : no acute distress [Edema] : no peripheral edema [Respiration, Rhythm And Depth] : normal respiratory rhythm and effort [Exaggerated Use Of Accessory Muscles For Inspiration] : no accessory muscle use [Normal Station and Gait] : the gait and station were normal for the patient's age [No Focal Deficits] : no focal deficits [Oriented To Time, Place, And Person] : oriented to person, place, and time [Affect] : the affect was normal [Mood] : the mood was normal [Not Anxious] : not anxious [Abdomen Soft] : soft [Abdomen Tenderness] : non-tender [Costovertebral Angle Tenderness] : no ~M costovertebral angle tenderness [Urinary Bladder Findings] : the bladder was normal on palpation [] : no rash [No Palpable Adenopathy] : no palpable adenopathy

## 2021-07-23 NOTE — ASSESSMENT
[FreeTextEntry1] : Very pleasant 76yo F hx of LAR for colovesical fistula presenting for evaluation of possible anterior bladder mass on CT, dysuria\par \par --CT images reviewed demonstrating a filling defect on anterior bladder wall concerning for possible mass\par --Urinalysis\par --Urine culture\par --Urine cytology\par --Patient will rtc for cystoscopy

## 2021-07-27 LAB
APPEARANCE: ABNORMAL
BACTERIA UR CULT: ABNORMAL
BILIRUBIN URINE: NEGATIVE
BLOOD URINE: NEGATIVE
COLOR: YELLOW
GLUCOSE QUALITATIVE U: NEGATIVE
KETONES URINE: NEGATIVE
LEUKOCYTE ESTERASE URINE: ABNORMAL
NITRITE URINE: POSITIVE
PH URINE: 6
PROTEIN URINE: NORMAL
SPECIFIC GRAVITY URINE: 1.02
URINE CYTOLOGY: NORMAL
UROBILINOGEN URINE: NORMAL

## 2021-08-05 ENCOUNTER — APPOINTMENT (OUTPATIENT)
Dept: UROLOGY | Facility: CLINIC | Age: 76
End: 2021-08-05

## 2021-08-05 ENCOUNTER — APPOINTMENT (OUTPATIENT)
Dept: CT IMAGING | Facility: IMAGING CENTER | Age: 76
End: 2021-08-05

## 2021-08-11 ENCOUNTER — APPOINTMENT (OUTPATIENT)
Dept: UROLOGY | Facility: CLINIC | Age: 76
End: 2021-08-11
Payer: MEDICARE

## 2021-08-11 DIAGNOSIS — N32.89 OTHER SPECIFIED DISORDERS OF BLADDER: ICD-10-CM

## 2021-08-11 PROCEDURE — 52000 CYSTOURETHROSCOPY: CPT

## 2021-08-11 PROCEDURE — 99214 OFFICE O/P EST MOD 30 MIN: CPT | Mod: 25

## 2021-08-11 NOTE — ASSESSMENT
[FreeTextEntry1] : Very pleasant 75-year-old woman who presents for follow-up of bladder wall thickening\par -CT from [place] radiology reviewed demonstrating significant improvement in anterior bladder wall thickening after surgery\par -Cystoscopy today demonstrates no urothelial lesions\par -Prior urine culture reviewed demonstrating E. coli urinary tract infection, treated with Cipro\par -Follow-up in 6 months with urine studies

## 2021-08-11 NOTE — HISTORY OF PRESENT ILLNESS
[FreeTextEntry1] : Very pleasant 75F hx of diverticulosis c/b colovesical fistula s/p LAR in Jan 2021 presents for evaluation of abnormality of bladder wall seen on CT. First demonstrated on CT cystogram in January performed as part of colovesical fistula workup, again abnormality re-demonstrated on CT Mar 2021. She was treated for a UTI while admitted to [facility].  She recently underwent a repeat CT scan which demonstrates significantly improved anterior bladder wall thickening, however with mild persistent thickening still present.  He also demonstrates resolution of colovesicular fistula after surgery.

## 2021-08-11 NOTE — PHYSICAL EXAM
[Abdomen Soft] : soft [Abdomen Tenderness] : non-tender [Costovertebral Angle Tenderness] : no ~M costovertebral angle tenderness [Urinary Bladder Findings] : the bladder was normal on palpation [No Palpable Adenopathy] : no palpable adenopathy [General Appearance - Well Developed] : well developed [General Appearance - Well Nourished] : well nourished [Normal Appearance] : normal appearance [Well Groomed] : well groomed [General Appearance - In No Acute Distress] : no acute distress [Edema] : no peripheral edema [] : no respiratory distress [Respiration, Rhythm And Depth] : normal respiratory rhythm and effort [Exaggerated Use Of Accessory Muscles For Inspiration] : no accessory muscle use [Oriented To Time, Place, And Person] : oriented to person, place, and time [Affect] : the affect was normal [Mood] : the mood was normal [Not Anxious] : not anxious [Normal Station and Gait] : the gait and station were normal for the patient's age [No Focal Deficits] : no focal deficits

## 2021-08-17 ENCOUNTER — APPOINTMENT (OUTPATIENT)
Dept: ELECTROPHYSIOLOGY | Facility: CLINIC | Age: 76
End: 2021-08-17

## 2021-10-19 ENCOUNTER — APPOINTMENT (OUTPATIENT)
Dept: ELECTROPHYSIOLOGY | Facility: CLINIC | Age: 76
End: 2021-10-19

## 2021-11-23 ENCOUNTER — APPOINTMENT (OUTPATIENT)
Dept: ELECTROPHYSIOLOGY | Facility: CLINIC | Age: 76
End: 2021-11-23

## 2022-02-11 NOTE — H&P PST ADULT - NS SC CAGE ALCOHOL GUILTY ABOUT
Minoxidil Counseling: Minoxidil is a topical medication which can increase blood flow where it is applied. It is uncertain how this medication increases hair growth. Side effects are uncommon and include stinging and allergic reactions. no

## 2022-02-16 ENCOUNTER — APPOINTMENT (OUTPATIENT)
Dept: UROLOGY | Facility: CLINIC | Age: 77
End: 2022-02-16

## 2022-06-09 NOTE — H&P PST ADULT - TOBACCO USE
Retention Suture Text: Retention sutures were placed to support the closure and prevent dehiscence. Former smoker

## 2022-06-24 ENCOUNTER — NON-APPOINTMENT (OUTPATIENT)
Age: 77
End: 2022-06-24

## 2022-06-30 LAB
APPEARANCE: CLEAR
BACTERIA: NEGATIVE
BILIRUBIN URINE: NEGATIVE
BLOOD URINE: NEGATIVE
COLOR: NORMAL
GLUCOSE QUALITATIVE U: NEGATIVE
HYALINE CASTS: 0 /LPF
KETONES URINE: NEGATIVE
LEUKOCYTE ESTERASE URINE: ABNORMAL
MICROSCOPIC-UA: NORMAL
NITRITE URINE: POSITIVE
PH URINE: 6
PROTEIN URINE: NEGATIVE
RED BLOOD CELLS URINE: 1 /HPF
SPECIFIC GRAVITY URINE: 1.01
SQUAMOUS EPITHELIAL CELLS: 1 /HPF
URINE CYTOLOGY: NORMAL
UROBILINOGEN URINE: NORMAL
WHITE BLOOD CELLS URINE: 4 /HPF

## 2022-06-30 RX ORDER — CIPROFLOXACIN HYDROCHLORIDE 500 MG/1
500 TABLET, FILM COATED ORAL TWICE DAILY
Qty: 10 | Refills: 0 | Status: ACTIVE | COMMUNITY
Start: 2021-07-27 | End: 1900-01-01

## 2022-07-08 ENCOUNTER — APPOINTMENT (OUTPATIENT)
Dept: UROLOGY | Facility: CLINIC | Age: 77
End: 2022-07-08

## 2022-07-08 VITALS
HEIGHT: 69 IN | BODY MASS INDEX: 17.77 KG/M2 | WEIGHT: 120 LBS | HEART RATE: 79 BPM | DIASTOLIC BLOOD PRESSURE: 68 MMHG | OXYGEN SATURATION: 98 % | SYSTOLIC BLOOD PRESSURE: 153 MMHG | TEMPERATURE: 97.3 F

## 2022-07-08 DIAGNOSIS — N39.0 URINARY TRACT INFECTION, SITE NOT SPECIFIED: ICD-10-CM

## 2022-07-08 PROCEDURE — 99213 OFFICE O/P EST LOW 20 MIN: CPT

## 2022-07-08 NOTE — ASSESSMENT
[FreeTextEntry1] : Very pleasant 76-year-old woman who presents for follow-up of colovesicular fistula with new finding of acute urinary tract infection, vaginal bleeding\par -I encouraged her to follow-up with her gynecologist as she is scheduled to do for endometrial biopsy\par -Urinalysis demonstrates 1 red blood cell per high-powered field\par -Urine culture demonstrates an E. coli urinary tract infection sensitive to Cipro which she completed\par -Urine cytology negative\par -Follow-up in 6 months or sooner if necessary

## 2022-07-08 NOTE — HISTORY OF PRESENT ILLNESS
[FreeTextEntry1] : Very pleasant 76-year-old woman with a history of a colovesicular fistula who presents for evaluation of an acute urinary tract infection.  She reports that she recently started taking Eliquis and subsequently noted vaginal bleeding.  She reports no blood in her urine.  Because of this, however, she underwent urine testing which demonstrated a negative urine cytology, positive nitrates, large leukocyte Estrace, 1 red blood cell per high-power field on urinalysis, and a urinary tract infection with E. coli sensitive to Cipro which she completed.  She stopped taking Eliquis.  She saw her gynecologist who recommended an endometrial biopsy which she is scheduled to have performed in the office.

## 2022-07-14 LAB — BACTERIA UR CULT: ABNORMAL

## 2022-07-14 RX ORDER — SULFAMETHOXAZOLE AND TRIMETHOPRIM 800; 160 MG/1; MG/1
800-160 TABLET ORAL
Qty: 10 | Refills: 0 | Status: ACTIVE | COMMUNITY
Start: 2022-07-14 | End: 1900-01-01

## 2022-08-09 ENCOUNTER — APPOINTMENT (OUTPATIENT)
Dept: UROLOGY | Facility: CLINIC | Age: 77
End: 2022-08-09

## 2022-11-22 ENCOUNTER — APPOINTMENT (OUTPATIENT)
Dept: ELECTROPHYSIOLOGY | Facility: CLINIC | Age: 77
End: 2022-11-22

## 2023-09-08 NOTE — DISCHARGE NOTE PROVIDER - NSDCQMPCI_CARD_ALL_CORE
LOV 8/3/23  F/U not scheduled     Prevalite 4 gm/dose powder   Last given 6/13/23 462 g, 1 refill    Refilled      No

## 2024-02-26 NOTE — ASU PATIENT PROFILE, ADULT - ABILITY TO HEAR (WITH HEARING AID OR HEARING APPLIANCE IF NORMALLY USED):
[FreeTextEntry1] : reviewed ER records. Showed me picture of leg on her cell phone prior to antibiotic.
Adequate: hears normal conversation without difficulty

## 2024-08-05 NOTE — ED PROVIDER NOTE - NS ED MD DISPO DIVISION
Shubham is AOX4, transitioned to po anticoagulation. Frequent rounding by staff, call light within reach.    Problem: Patient Centered Care  Goal: Patient preferences are identified and integrated in the patient's plan of care  Description: Interventions:  - What would you like us to know as we care for you? From home with   - Provide timely, complete, and accurate information to patient/family  - Incorporate patient and family knowledge, values, beliefs, and cultural backgrounds into the planning and delivery of care  - Encourage patient/family to participate in care and decision-making at the level they choose  - Honor patient and family perspectives and choices  Outcome: Progressing     Problem: Patient/Family Goals  Goal: Patient/Family Long Term Goal  Description: Patient's Long Term Goal: go home    Interventions:  - follow md orders  - See additional Care Plan goals for specific interventions  Outcome: Progressing  Goal: Patient/Family Short Term Goal  Description: Patient's Short Term Goal: remain safe in hospital    Interventions:   - monitor on tele  - heparin gtt  - See additional Care Plan goals for specific interventions  Outcome: Progressing     Problem: CARDIOVASCULAR - ADULT  Goal: Maintains optimal cardiac output and hemodynamic stability  Description: INTERVENTIONS:  - Monitor vital signs, rhythm, and trends  - Monitor for bleeding, hypotension and signs of decreased cardiac output  - Evaluate effectiveness of vasoactive medications to optimize hemodynamic stability  - Monitor arterial and/or venous puncture sites for bleeding and/or hematoma  - Assess quality of pulses, skin color and temperature  - Assess for signs of decreased coronary artery perfusion - ex. Angina  - Evaluate fluid balance, assess for edema, trend weights  Outcome: Progressing  Goal: Absence of cardiac arrhythmias or at baseline  Description: INTERVENTIONS:  - Continuous cardiac monitoring, monitor vital signs, obtain 12  lead EKG if indicated  - Evaluate effectiveness of antiarrhythmic and heart rate control medications as ordered  - Initiate emergency measures for life threatening arrhythmias  - Monitor electrolytes and administer replacement therapy as ordered  Outcome: Progressing     Problem: SAFETY ADULT - FALL  Goal: Free from fall injury  Description: INTERVENTIONS:  - Assess pt frequently for physical needs  - Identify cognitive and physical deficits and behaviors that affect risk of falls.  - [place] fall precautions as indicated by assessment.  - Educate pt/family on patient safety including physical limitations  - Instruct pt to call for assistance with activity based on assessment  - Modify environment to reduce risk of injury  - Provide assistive devices as appropriate  - Consider OT/PT consult to assist with strengthening/mobility  - Encourage toileting schedule  Outcome: Progressing     Problem: DISCHARGE PLANNING  Goal: Discharge to home or other facility with appropriate resources  Description: INTERVENTIONS:  - Identify barriers to discharge w/pt and caregiver  - Include patient/family/discharge partner in discharge planning  - Arrange for needed discharge resources and transportation as appropriate  - Identify discharge learning needs (meds, wound care, etc)  - Arrange for interpreters to assist at discharge as needed  - Consider post-discharge preferences of patient/family/discharge partner  - Complete POLST form as appropriate  - Assess patient's ability to be responsible for managing their own health  - Refer to Case Management Department for coordinating discharge planning if the patient needs post-hospital services based on physician/LIP order or complex needs related to functional status, cognitive ability or social support system  Outcome: Progressing      ASHLEY